# Patient Record
Sex: MALE | Race: WHITE | Employment: FULL TIME | URBAN - NONMETROPOLITAN AREA
[De-identification: names, ages, dates, MRNs, and addresses within clinical notes are randomized per-mention and may not be internally consistent; named-entity substitution may affect disease eponyms.]

---

## 2017-01-14 ENCOUNTER — OFFICE VISIT (OUTPATIENT)
Dept: FAMILY MEDICINE CLINIC | Age: 47
End: 2017-01-14

## 2017-01-14 VITALS
HEIGHT: 67 IN | WEIGHT: 200 LBS | BODY MASS INDEX: 31.39 KG/M2 | TEMPERATURE: 98.2 F | DIASTOLIC BLOOD PRESSURE: 72 MMHG | SYSTOLIC BLOOD PRESSURE: 121 MMHG | HEART RATE: 62 BPM | RESPIRATION RATE: 12 BRPM

## 2017-01-14 DIAGNOSIS — E78.00 HYPERCHOLESTEROLEMIA: ICD-10-CM

## 2017-01-14 DIAGNOSIS — R22.9 SKIN MASS: ICD-10-CM

## 2017-01-14 DIAGNOSIS — E10.69 TYPE 1 DIABETES MELLITUS WITH OTHER SPECIFIED COMPLICATION (HCC): Primary | ICD-10-CM

## 2017-01-14 DIAGNOSIS — N52.9 ERECTILE DYSFUNCTION, UNSPECIFIED ERECTILE DYSFUNCTION TYPE: ICD-10-CM

## 2017-01-14 LAB — HBA1C MFR BLD: 8.4 %

## 2017-01-14 PROCEDURE — G8427 DOCREV CUR MEDS BY ELIG CLIN: HCPCS | Performed by: FAMILY MEDICINE

## 2017-01-14 PROCEDURE — G8419 CALC BMI OUT NRM PARAM NOF/U: HCPCS | Performed by: FAMILY MEDICINE

## 2017-01-14 PROCEDURE — 3045F PR MOST RECENT HEMOGLOBIN A1C LEVEL 7.0-9.0%: CPT | Performed by: FAMILY MEDICINE

## 2017-01-14 PROCEDURE — 1036F TOBACCO NON-USER: CPT | Performed by: FAMILY MEDICINE

## 2017-01-14 PROCEDURE — 83036 HEMOGLOBIN GLYCOSYLATED A1C: CPT | Performed by: FAMILY MEDICINE

## 2017-01-14 PROCEDURE — 99214 OFFICE O/P EST MOD 30 MIN: CPT | Performed by: FAMILY MEDICINE

## 2017-01-14 PROCEDURE — G8484 FLU IMMUNIZE NO ADMIN: HCPCS | Performed by: FAMILY MEDICINE

## 2017-01-14 RX ORDER — INSULIN GLARGINE 100 [IU]/ML
60 INJECTION, SOLUTION SUBCUTANEOUS
Qty: 15 PEN | Refills: 5 | Status: SHIPPED | OUTPATIENT
Start: 2017-01-14 | End: 2017-09-28 | Stop reason: DRUGHIGH

## 2017-01-14 RX ORDER — SILDENAFIL 100 MG/1
100 TABLET, FILM COATED ORAL PRN
Qty: 45 TABLET | Refills: 3 | Status: SHIPPED | OUTPATIENT
Start: 2017-01-14 | End: 2018-02-28 | Stop reason: SDUPTHER

## 2017-01-14 ASSESSMENT — PATIENT HEALTH QUESTIONNAIRE - PHQ9
1. LITTLE INTEREST OR PLEASURE IN DOING THINGS: 0
SUM OF ALL RESPONSES TO PHQ QUESTIONS 1-9: 0
SUM OF ALL RESPONSES TO PHQ9 QUESTIONS 1 & 2: 0
2. FEELING DOWN, DEPRESSED OR HOPELESS: 0

## 2017-01-28 ENCOUNTER — OFFICE VISIT (OUTPATIENT)
Dept: FAMILY MEDICINE CLINIC | Age: 47
End: 2017-01-28

## 2017-01-28 VITALS
WEIGHT: 201 LBS | HEIGHT: 67 IN | HEART RATE: 91 BPM | BODY MASS INDEX: 31.55 KG/M2 | DIASTOLIC BLOOD PRESSURE: 64 MMHG | RESPIRATION RATE: 10 BRPM | TEMPERATURE: 98.6 F | SYSTOLIC BLOOD PRESSURE: 102 MMHG

## 2017-01-28 DIAGNOSIS — D22.9 ATYPICAL MOLE: Primary | ICD-10-CM

## 2017-01-28 DIAGNOSIS — E10.69 TYPE 1 DIABETES MELLITUS WITH OTHER SPECIFIED COMPLICATION (HCC): ICD-10-CM

## 2017-01-28 DIAGNOSIS — L91.8 SKIN TAG: ICD-10-CM

## 2017-01-28 DIAGNOSIS — D22.9 ATYPICAL MOLE: ICD-10-CM

## 2017-01-28 PROCEDURE — G8484 FLU IMMUNIZE NO ADMIN: HCPCS | Performed by: FAMILY MEDICINE

## 2017-01-28 PROCEDURE — 11200 RMVL SKIN TAGS UP TO&INC 15: CPT | Performed by: FAMILY MEDICINE

## 2017-01-28 PROCEDURE — 3045F PR MOST RECENT HEMOGLOBIN A1C LEVEL 7.0-9.0%: CPT | Performed by: FAMILY MEDICINE

## 2017-01-28 PROCEDURE — 99212 OFFICE O/P EST SF 10 MIN: CPT | Performed by: FAMILY MEDICINE

## 2017-01-28 PROCEDURE — G8427 DOCREV CUR MEDS BY ELIG CLIN: HCPCS | Performed by: FAMILY MEDICINE

## 2017-01-28 PROCEDURE — 11100 PR SHAV SKIN LES 6-10MM TRUNK,ARM,LEG: CPT | Performed by: FAMILY MEDICINE

## 2017-01-28 PROCEDURE — G8419 CALC BMI OUT NRM PARAM NOF/U: HCPCS | Performed by: FAMILY MEDICINE

## 2017-01-28 PROCEDURE — 4004F PT TOBACCO SCREEN RCVD TLK: CPT | Performed by: FAMILY MEDICINE

## 2017-01-31 ENCOUNTER — TELEPHONE (OUTPATIENT)
Dept: FAMILY MEDICINE CLINIC | Age: 47
End: 2017-01-31

## 2017-03-14 ENCOUNTER — TELEPHONE (OUTPATIENT)
Dept: PULMONOLOGY | Age: 47
End: 2017-03-14

## 2017-03-27 ENCOUNTER — TELEPHONE (OUTPATIENT)
Dept: FAMILY MEDICINE CLINIC | Age: 47
End: 2017-03-27

## 2017-05-01 ENCOUNTER — INITIAL CONSULT (OUTPATIENT)
Dept: PULMONOLOGY | Age: 47
End: 2017-05-01

## 2017-05-01 VITALS
SYSTOLIC BLOOD PRESSURE: 130 MMHG | HEART RATE: 87 BPM | BODY MASS INDEX: 31.23 KG/M2 | WEIGHT: 199 LBS | HEIGHT: 67 IN | RESPIRATION RATE: 16 BRPM | DIASTOLIC BLOOD PRESSURE: 82 MMHG | OXYGEN SATURATION: 96 %

## 2017-05-01 DIAGNOSIS — G47.33 OSA (OBSTRUCTIVE SLEEP APNEA): ICD-10-CM

## 2017-05-01 DIAGNOSIS — R40.0 DAYTIME SLEEPINESS: ICD-10-CM

## 2017-05-01 DIAGNOSIS — R06.83 SNORING: ICD-10-CM

## 2017-05-01 PROCEDURE — 99203 OFFICE O/P NEW LOW 30 MIN: CPT | Performed by: INTERNAL MEDICINE

## 2017-05-01 PROCEDURE — G8419 CALC BMI OUT NRM PARAM NOF/U: HCPCS | Performed by: INTERNAL MEDICINE

## 2017-05-01 PROCEDURE — G8427 DOCREV CUR MEDS BY ELIG CLIN: HCPCS | Performed by: INTERNAL MEDICINE

## 2017-05-01 PROCEDURE — 4004F PT TOBACCO SCREEN RCVD TLK: CPT | Performed by: INTERNAL MEDICINE

## 2017-07-24 RX ORDER — PEN NEEDLE, DIABETIC 30 GX5/16"
NEEDLE, DISPOSABLE MISCELLANEOUS
Qty: 450 EACH | Refills: 5 | Status: SHIPPED | OUTPATIENT
Start: 2017-07-24 | End: 2019-09-27 | Stop reason: SDUPTHER

## 2017-08-15 ENCOUNTER — TELEPHONE (OUTPATIENT)
Dept: FAMILY MEDICINE CLINIC | Age: 47
End: 2017-08-15

## 2017-09-21 ENCOUNTER — TELEPHONE (OUTPATIENT)
Dept: FAMILY MEDICINE CLINIC | Age: 47
End: 2017-09-21

## 2017-09-28 ENCOUNTER — OFFICE VISIT (OUTPATIENT)
Dept: FAMILY MEDICINE CLINIC | Age: 47
End: 2017-09-28
Payer: COMMERCIAL

## 2017-09-28 VITALS
BODY MASS INDEX: 31.55 KG/M2 | HEIGHT: 67 IN | DIASTOLIC BLOOD PRESSURE: 67 MMHG | HEART RATE: 60 BPM | TEMPERATURE: 98.7 F | WEIGHT: 201 LBS | RESPIRATION RATE: 12 BRPM | SYSTOLIC BLOOD PRESSURE: 124 MMHG

## 2017-09-28 DIAGNOSIS — E10.69 TYPE 1 DIABETES MELLITUS WITH OTHER SPECIFIED COMPLICATION (HCC): Primary | ICD-10-CM

## 2017-09-28 DIAGNOSIS — G89.29 CHRONIC RIGHT SHOULDER PAIN: ICD-10-CM

## 2017-09-28 DIAGNOSIS — M25.511 CHRONIC RIGHT SHOULDER PAIN: ICD-10-CM

## 2017-09-28 LAB
CREATININE URINE POCT: NORMAL
HBA1C MFR BLD: 9 %
MICROALBUMIN/CREAT 24H UR: NORMAL MG/G{CREAT}
MICROALBUMIN/CREAT UR-RTO: NORMAL

## 2017-09-28 PROCEDURE — 83036 HEMOGLOBIN GLYCOSYLATED A1C: CPT | Performed by: FAMILY MEDICINE

## 2017-09-28 PROCEDURE — 4004F PT TOBACCO SCREEN RCVD TLK: CPT | Performed by: FAMILY MEDICINE

## 2017-09-28 PROCEDURE — G8427 DOCREV CUR MEDS BY ELIG CLIN: HCPCS | Performed by: FAMILY MEDICINE

## 2017-09-28 PROCEDURE — 99213 OFFICE O/P EST LOW 20 MIN: CPT | Performed by: FAMILY MEDICINE

## 2017-09-28 PROCEDURE — 3046F HEMOGLOBIN A1C LEVEL >9.0%: CPT | Performed by: FAMILY MEDICINE

## 2017-09-28 PROCEDURE — G8417 CALC BMI ABV UP PARAM F/U: HCPCS | Performed by: FAMILY MEDICINE

## 2017-09-28 PROCEDURE — 82044 UR ALBUMIN SEMIQUANTITATIVE: CPT | Performed by: FAMILY MEDICINE

## 2017-09-28 RX ORDER — ETODOLAC 400 MG/1
400 TABLET, FILM COATED ORAL 2 TIMES DAILY PRN
Qty: 60 TABLET | Refills: 3 | Status: SHIPPED | OUTPATIENT
Start: 2017-09-28 | End: 2018-02-28 | Stop reason: SDUPTHER

## 2017-09-28 RX ORDER — INSULIN GLARGINE 100 [IU]/ML
65 INJECTION, SOLUTION SUBCUTANEOUS
Qty: 15 PEN | Refills: 5 | Status: SHIPPED | OUTPATIENT
Start: 2017-09-28 | End: 2018-02-28 | Stop reason: SDUPTHER

## 2018-02-28 ENCOUNTER — HOSPITAL ENCOUNTER (OUTPATIENT)
Age: 48
Discharge: HOME | End: 2018-02-28
Payer: COMMERCIAL

## 2018-02-28 ENCOUNTER — TELEPHONE (OUTPATIENT)
Dept: FAMILY MEDICINE CLINIC | Age: 48
End: 2018-02-28

## 2018-02-28 ENCOUNTER — OFFICE VISIT (OUTPATIENT)
Dept: FAMILY MEDICINE CLINIC | Age: 48
End: 2018-02-28
Payer: COMMERCIAL

## 2018-02-28 VITALS
HEART RATE: 94 BPM | BODY MASS INDEX: 32.49 KG/M2 | DIASTOLIC BLOOD PRESSURE: 87 MMHG | SYSTOLIC BLOOD PRESSURE: 138 MMHG | TEMPERATURE: 98.1 F | RESPIRATION RATE: 10 BRPM | WEIGHT: 207 LBS | HEIGHT: 67 IN

## 2018-02-28 DIAGNOSIS — R53.83 OTHER FATIGUE: ICD-10-CM

## 2018-02-28 DIAGNOSIS — M25.511 CHRONIC RIGHT SHOULDER PAIN: ICD-10-CM

## 2018-02-28 DIAGNOSIS — G89.29 CHRONIC RIGHT SHOULDER PAIN: ICD-10-CM

## 2018-02-28 DIAGNOSIS — R40.0 DAYTIME SLEEPINESS: ICD-10-CM

## 2018-02-28 DIAGNOSIS — E10.8 TYPE 1 DIABETES MELLITUS WITH COMPLICATION (HCC): ICD-10-CM

## 2018-02-28 DIAGNOSIS — N50.89 MASS OF LEFT TESTICLE: ICD-10-CM

## 2018-02-28 DIAGNOSIS — E10.69 TYPE 1 DIABETES MELLITUS WITH OTHER SPECIFIED COMPLICATION (HCC): ICD-10-CM

## 2018-02-28 DIAGNOSIS — N52.9 ERECTILE DYSFUNCTION, UNSPECIFIED ERECTILE DYSFUNCTION TYPE: ICD-10-CM

## 2018-02-28 DIAGNOSIS — E10.69 TYPE 1 DIABETES MELLITUS WITH OTHER SPECIFIED COMPLICATION (HCC): Primary | ICD-10-CM

## 2018-02-28 LAB
ALBUMIN SERPL-MCNC: 4.1 G/DL (ref 3.5–5.1)
ALP BLD-CCNC: 107 U/L (ref 38–126)
ALT SERPL-CCNC: 27 U/L (ref 11–66)
ANION GAP SERPL CALCULATED.3IONS-SCNC: 11 MEQ/L (ref 8–16)
AST SERPL-CCNC: 18 U/L (ref 5–40)
BASOPHILS # BLD: 0.4 %
BASOPHILS ABSOLUTE: 0 THOU/MM3 (ref 0–0.1)
BILIRUB SERPL-MCNC: 0.3 MG/DL (ref 0.3–1.2)
BUN BLDV-MCNC: 12 MG/DL (ref 7–22)
CALCIUM SERPL-MCNC: 9.2 MG/DL (ref 8.5–10.5)
CHLORIDE BLD-SCNC: 102 MEQ/L (ref 98–111)
CO2: 27 MEQ/L (ref 23–33)
CREAT SERPL-MCNC: 0.7 MG/DL (ref 0.4–1.2)
EOSINOPHIL # BLD: 1.7 %
EOSINOPHILS ABSOLUTE: 0.2 THOU/MM3 (ref 0–0.4)
GFR SERPL CREATININE-BSD FRML MDRD: > 90 ML/MIN/1.73M2
GLUCOSE BLD-MCNC: 169 MG/DL (ref 70–108)
HBA1C MFR BLD: 8.2 %
HCT VFR BLD CALC: 47.2 % (ref 42–52)
HEMOGLOBIN: 15.8 GM/DL (ref 14–18)
LYMPHOCYTES # BLD: 17.6 %
LYMPHOCYTES ABSOLUTE: 1.9 THOU/MM3 (ref 1–4.8)
MCH RBC QN AUTO: 31.8 PG (ref 27–31)
MCHC RBC AUTO-ENTMCNC: 33.4 GM/DL (ref 33–37)
MCV RBC AUTO: 95.2 FL (ref 80–94)
MONOCYTES # BLD: 7.4 %
MONOCYTES ABSOLUTE: 0.8 THOU/MM3 (ref 0.4–1.3)
NUCLEATED RED BLOOD CELLS: 0 /100 WBC
PDW BLD-RTO: 14.4 % (ref 11.5–14.5)
PLATELET # BLD: 317 THOU/MM3 (ref 130–400)
PMV BLD AUTO: 10.2 FL (ref 7.4–10.4)
POTASSIUM SERPL-SCNC: 5.1 MEQ/L (ref 3.5–5.2)
RBC # BLD: 4.95 MILL/MM3 (ref 4.7–6.1)
SEG NEUTROPHILS: 72.9 %
SEGMENTED NEUTROPHILS ABSOLUTE COUNT: 7.9 THOU/MM3 (ref 1.8–7.7)
SODIUM BLD-SCNC: 140 MEQ/L (ref 135–145)
TOTAL PROTEIN: 6.9 G/DL (ref 6.1–8)
TSH SERPL DL<=0.05 MIU/L-ACNC: 1.27 UIU/ML (ref 0.4–4.2)
WBC # BLD: 10.9 THOU/MM3 (ref 4.8–10.8)

## 2018-02-28 PROCEDURE — 83036 HEMOGLOBIN GLYCOSYLATED A1C: CPT | Performed by: FAMILY MEDICINE

## 2018-02-28 PROCEDURE — 85025 COMPLETE CBC W/AUTO DIFF WBC: CPT

## 2018-02-28 PROCEDURE — 3045F PR MOST RECENT HEMOGLOBIN A1C LEVEL 7.0-9.0%: CPT | Performed by: FAMILY MEDICINE

## 2018-02-28 PROCEDURE — G8484 FLU IMMUNIZE NO ADMIN: HCPCS | Performed by: FAMILY MEDICINE

## 2018-02-28 PROCEDURE — G8417 CALC BMI ABV UP PARAM F/U: HCPCS | Performed by: FAMILY MEDICINE

## 2018-02-28 PROCEDURE — 36415 COLL VENOUS BLD VENIPUNCTURE: CPT

## 2018-02-28 PROCEDURE — 99214 OFFICE O/P EST MOD 30 MIN: CPT | Performed by: FAMILY MEDICINE

## 2018-02-28 PROCEDURE — 84443 ASSAY THYROID STIM HORMONE: CPT

## 2018-02-28 PROCEDURE — 4004F PT TOBACCO SCREEN RCVD TLK: CPT | Performed by: FAMILY MEDICINE

## 2018-02-28 PROCEDURE — 80053 COMPREHEN METABOLIC PANEL: CPT

## 2018-02-28 PROCEDURE — G8427 DOCREV CUR MEDS BY ELIG CLIN: HCPCS | Performed by: FAMILY MEDICINE

## 2018-02-28 RX ORDER — SILDENAFIL 100 MG/1
100 TABLET, FILM COATED ORAL PRN
Qty: 45 TABLET | Refills: 3 | Status: SHIPPED | OUTPATIENT
Start: 2018-02-28 | End: 2019-01-14 | Stop reason: SDUPTHER

## 2018-02-28 RX ORDER — ETODOLAC 400 MG/1
400 TABLET, FILM COATED ORAL 2 TIMES DAILY PRN
Qty: 180 TABLET | Refills: 3 | Status: SHIPPED | OUTPATIENT
Start: 2018-02-28 | End: 2018-05-07

## 2018-02-28 RX ORDER — INSULIN GLARGINE 100 [IU]/ML
65 INJECTION, SOLUTION SUBCUTANEOUS
Qty: 15 PEN | Refills: 5 | Status: SHIPPED | OUTPATIENT
Start: 2018-02-28 | End: 2018-03-07 | Stop reason: SDUPTHER

## 2018-02-28 ASSESSMENT — PATIENT HEALTH QUESTIONNAIRE - PHQ9
SUM OF ALL RESPONSES TO PHQ QUESTIONS 1-9: 0
2. FEELING DOWN, DEPRESSED OR HOPELESS: 0
SUM OF ALL RESPONSES TO PHQ9 QUESTIONS 1 & 2: 0
1. LITTLE INTEREST OR PLEASURE IN DOING THINGS: 0

## 2018-02-28 NOTE — PROGRESS NOTES
Washington Peres is a 41 y.o. male that presents for Follow-up and Other (due for DM eye and foot exam )        HPI:    Patient notes that he is having more fatigue recently, states that it has been present for several months though. States that he comes home from work and just wants to sleep. Feels very tired when waking up. Does have a history of RANDEE, but does not use CPAP. Mood has been 'fine'. Found a mass on his left testicle 3 days ago. Notes that he is having some soreness with this as well. No tenderness or discoloration. No change in size since first noticing. Diabetes Type 1    Glucose control:   Does patient check blood glucoses at home? Yes - states that BGs are labile per patient  Report of hypoglycemia: no  Lab Results   Component Value Date    LABA1C 8.2 02/28/2018     No results found for: EAG    Symptoms  Polyuria, Polydipsia or Polyphagia? No  Chest Pain, SOB, or Palpitations? -  No  New Vision complaints? No  Paresthesias of the extremities? No    Medications  Current medication were reviewed. Compliant with medications? yes  Medication side effects? No  On ACE-I or ARB? No  On antiplatelet therapy? Yes  On Statin? No    Exercise  Exercise? No  Wt Readings from Last 3 Encounters:   02/28/18 207 lb (93.9 kg)   09/28/17 201 lb (91.2 kg)   05/01/17 199 lb (90.3 kg)       Diet discipline?:  Low salt, fat, sugar diet?   No    Blood pressure control:  BP Readings from Last 3 Encounters:   02/28/18 138/87   09/28/17 124/67   05/01/17 130/82       Lab Results   Component Value Date    LABMICR 0.70 07/29/2013       Lab Results   Component Value Date    LDLCALC 156 03/22/2016           Health Maintenance   Topic Date Due    HIV screen  03/31/1987    DTaP/Tdap/Td vaccine (1 - Tdap) 03/31/1991    Diabetic retinal exam  06/27/2015    Lipid screen  03/22/2017    Diabetic foot exam  04/01/2017    Flu vaccine (1) 09/01/2017    A1C test (Diabetic or Prediabetic)  09/28/2018    Diabetic microalbuminuria test  09/28/2018    Pneumococcal med risk  Completed       Past Medical History:   Diagnosis Date    Hyperlipidemia 2012    Type 1 diabetes mellitus ([facility] [address] 1995    at age 27       History reviewed. No pertinent surgical history. Social History   Substance Use Topics    Smoking status: Current Every Day Smoker     Packs/day: 0.50     Years: 15.00    Smokeless tobacco: Never Used    Alcohol use Yes      Comment: social       Family History   Problem Relation Age of Onset    Asthma Mother     High Blood Pressure Father 48    Asthma Sister          I have reviewed the patient's past medical history, past surgical history, allergies, medications, social and family history and I have made updates where appropriate.     ROS        PHYSICAL EXAM:  /87   Pulse 94   Temp 98.1 °F (36.7 °C) (Oral)   Resp 10   Ht 5' 7\" (1.702 m)   Wt 207 lb (93.9 kg)   BMI 32.42 kg/m²     General Appearance: well developed and well- nourished, in no acute distress  Head: normocephalic and atraumatic  ENT: external ear and ear canal normal bilaterally, nose without deformity, nasal mucosa and turbinates normal without polyps, oropharynx normal, dentition is normal for age, no lip or gum lesions noted  Neck: supple and non-tender without mass, no thyromegaly or thyroid nodules, no cervical lymphadenopathy  Pulmonary/Chest: clear to auscultation bilaterally- no wheezes, rales or rhonchi, normal air movement, no respiratory distress or retractions  Cardiovascular: normal rate, regular rhythm, normal S1 and S2, no murmurs, rubs, clicks, or gallops, distal pulses intact  Extremities: no cyanosis, clubbing or edema of the lower extremities  Psych:  Normal affect without evidence of depression or anxiety, insight and judgement are appropriate, memory appears intact  Skin: warm and dry, no rash or erythema   Exam:  normal genitalia, normal testes and scrotum, no hernias present, no urethral discharge

## 2018-02-28 NOTE — PATIENT INSTRUCTIONS
You may receive a survey about your visit with us today. The feedback from our patients helps us identify what is working well and where the service to all patients can be enhanced. Thank you! Tobacco Cessation Programs     Telephonic behavior modification  Krzysztof Casper ([phone])   Counseling service for those who are ready to quit using tobacco.     Available for uninsured [place] residents, [facility] recipients, pregnant women, or patients whose health plans or employers are members of the [address] [place] [address] behavior modification   http://[place]. Quitlogix. org   Online support program to help patients through each step of the quitting process. Available 24 hours a day 7 days a week. Provides up to date researched based tool, step-by-step guides, and motivational messages. Online behavior modification   www.lungusa.org/stop-smoking/how-to-quit   HelpLine: [facility] (872-3106)   Email questions to:  Yonas@[employer]. [employer]    Website offers resources to help tobacco users figure out their reasons for quitting and then take the big step of quitting for good. Hypnosis   Location: [facility], [place], [place]   Contact: Donn Perera, PhD at [phone]   Hypnosis for tobacco cessation   Cost $225 for the initial session and $175 for each session afterwards. Most patients require 6-8 sessions. There is the option to submit through the patients insurance. Hypnosis and behavior modification   Location: [facility],  [address] [place]   Contact: Chilo Carney, PhD at [phone]  Nabila Flannery Counseling and hypnosis for nicotine addition   Cost: For uninsured patients:  Please call above phone number  Cost for insured patients depends on patients insurance plan.     Behavior modification   Location: [facility], [address] [place], [address]: [place] [address] include four one-on-one appointments between the patient and a

## 2018-03-01 ENCOUNTER — TELEPHONE (OUTPATIENT)
Dept: FAMILY MEDICINE CLINIC | Age: 48
End: 2018-03-01

## 2018-03-01 DIAGNOSIS — G47.33 OSA (OBSTRUCTIVE SLEEP APNEA): Primary | ICD-10-CM

## 2018-03-01 NOTE — TELEPHONE ENCOUNTER
Pt informed and verbalized understanding. He is interested in seeing a pulm/sleep center for his fatigue.

## 2018-03-07 ENCOUNTER — TELEPHONE (OUTPATIENT)
Dept: FAMILY MEDICINE CLINIC | Age: 48
End: 2018-03-07

## 2018-03-07 ENCOUNTER — HOSPITAL ENCOUNTER (OUTPATIENT)
Dept: ULTRASOUND IMAGING | Age: 48
Discharge: HOME | End: 2018-03-07
Payer: COMMERCIAL

## 2018-03-07 DIAGNOSIS — N50.89 MASS OF LEFT TESTICLE: ICD-10-CM

## 2018-03-07 DIAGNOSIS — E10.69 TYPE 1 DIABETES MELLITUS WITH OTHER SPECIFIED COMPLICATION (HCC): ICD-10-CM

## 2018-03-07 PROCEDURE — 76870 US EXAM SCROTUM: CPT

## 2018-03-07 RX ORDER — INSULIN GLARGINE 100 [IU]/ML
65 INJECTION, SOLUTION SUBCUTANEOUS
Qty: 15 PEN | Refills: 5 | Status: SHIPPED | OUTPATIENT
Start: 2018-03-07 | End: 2019-01-14 | Stop reason: DRUGHIGH

## 2018-03-08 ENCOUNTER — TELEPHONE (OUTPATIENT)
Dept: FAMILY MEDICINE CLINIC | Age: 48
End: 2018-03-08

## 2018-03-08 NOTE — TELEPHONE ENCOUNTER
----- Message from Kyle Mcknight DO sent at 3/8/2018 10:53 AM EST -----  US displayed a small fluid collection bilaterally. This is a benign finding. If he would like to pursue further treatment for this, I can refer him to urology.

## 2018-05-07 ENCOUNTER — OFFICE VISIT (OUTPATIENT)
Dept: PULMONOLOGY | Age: 48
End: 2018-05-07
Payer: COMMERCIAL

## 2018-05-07 VITALS
OXYGEN SATURATION: 96 % | SYSTOLIC BLOOD PRESSURE: 122 MMHG | WEIGHT: 205.6 LBS | BODY MASS INDEX: 32.27 KG/M2 | DIASTOLIC BLOOD PRESSURE: 84 MMHG | RESPIRATION RATE: 16 BRPM | HEART RATE: 88 BPM | HEIGHT: 67 IN

## 2018-05-07 DIAGNOSIS — G47.33 OSA (OBSTRUCTIVE SLEEP APNEA): Primary | ICD-10-CM

## 2018-05-07 PROCEDURE — 4004F PT TOBACCO SCREEN RCVD TLK: CPT | Performed by: INTERNAL MEDICINE

## 2018-05-07 PROCEDURE — G8417 CALC BMI ABV UP PARAM F/U: HCPCS | Performed by: INTERNAL MEDICINE

## 2018-05-07 PROCEDURE — G8427 DOCREV CUR MEDS BY ELIG CLIN: HCPCS | Performed by: INTERNAL MEDICINE

## 2018-05-07 PROCEDURE — 99213 OFFICE O/P EST LOW 20 MIN: CPT | Performed by: INTERNAL MEDICINE

## 2018-05-08 ENCOUNTER — TELEPHONE (OUTPATIENT)
Dept: SLEEP CENTER | Age: 48
End: 2018-05-08

## 2018-05-09 ENCOUNTER — TELEPHONE (OUTPATIENT)
Dept: SLEEP CENTER | Age: 48
End: 2018-05-09

## 2018-06-08 DIAGNOSIS — G47.33 OSA (OBSTRUCTIVE SLEEP APNEA): Primary | ICD-10-CM

## 2018-06-09 ENCOUNTER — HOSPITAL ENCOUNTER (OUTPATIENT)
Age: 48
Discharge: HOME | End: 2018-06-09
Payer: COMMERCIAL

## 2018-06-09 ENCOUNTER — OFFICE VISIT (OUTPATIENT)
Dept: FAMILY MEDICINE CLINIC | Age: 48
End: 2018-06-09
Payer: COMMERCIAL

## 2018-06-09 VITALS
TEMPERATURE: 98.3 F | SYSTOLIC BLOOD PRESSURE: 118 MMHG | RESPIRATION RATE: 10 BRPM | BODY MASS INDEX: 30.92 KG/M2 | HEIGHT: 68 IN | HEART RATE: 91 BPM | WEIGHT: 204 LBS | DIASTOLIC BLOOD PRESSURE: 62 MMHG

## 2018-06-09 DIAGNOSIS — Z72.51 HIGH RISK SEXUAL BEHAVIOR: ICD-10-CM

## 2018-06-09 DIAGNOSIS — E10.69 TYPE 1 DIABETES MELLITUS WITH OTHER SPECIFIED COMPLICATION (HCC): Primary | ICD-10-CM

## 2018-06-09 DIAGNOSIS — E78.00 HYPERCHOLESTEROLEMIA: ICD-10-CM

## 2018-06-09 DIAGNOSIS — Z11.4 SCREENING FOR HIV (HUMAN IMMUNODEFICIENCY VIRUS): ICD-10-CM

## 2018-06-09 DIAGNOSIS — E10.69 TYPE 1 DIABETES MELLITUS WITH OTHER SPECIFIED COMPLICATION (HCC): ICD-10-CM

## 2018-06-09 LAB
ALBUMIN SERPL-MCNC: 4.4 G/DL (ref 3.5–5.1)
ALP BLD-CCNC: 115 U/L (ref 38–126)
ALT SERPL-CCNC: 35 U/L (ref 11–66)
ANION GAP SERPL CALCULATED.3IONS-SCNC: 12 MEQ/L (ref 8–16)
AST SERPL-CCNC: 25 U/L (ref 5–40)
BILIRUB SERPL-MCNC: 0.4 MG/DL (ref 0.3–1.2)
BUN BLDV-MCNC: 11 MG/DL (ref 7–22)
CALCIUM SERPL-MCNC: 9.3 MG/DL (ref 8.5–10.5)
CHLAMYDIA TRACHOMATIS BY RT-PCR: NOT DETECTED
CHLORIDE BLD-SCNC: 104 MEQ/L (ref 98–111)
CHOLESTEROL, TOTAL: 237 MG/DL (ref 100–199)
CO2: 27 MEQ/L (ref 23–33)
CREAT SERPL-MCNC: 0.7 MG/DL (ref 0.4–1.2)
CT/NG SOURCE: NORMAL
GFR SERPL CREATININE-BSD FRML MDRD: > 90 ML/MIN/1.73M2
GLUCOSE BLD-MCNC: 221 MG/DL (ref 70–108)
HBA1C MFR BLD: 8.9 %
HDLC SERPL-MCNC: 50 MG/DL
LDL CHOLESTEROL CALCULATED: 165 MG/DL
NEISSERIA GONORRHOEAE BY RT-PCR: NOT DETECTED
POTASSIUM SERPL-SCNC: 4.6 MEQ/L (ref 3.5–5.2)
RPR: NONREACTIVE
SODIUM BLD-SCNC: 143 MEQ/L (ref 135–145)
TOTAL PROTEIN: 7.6 G/DL (ref 6.1–8)
TRIGL SERPL-MCNC: 109 MG/DL (ref 0–199)

## 2018-06-09 PROCEDURE — 36415 COLL VENOUS BLD VENIPUNCTURE: CPT

## 2018-06-09 PROCEDURE — 80061 LIPID PANEL: CPT

## 2018-06-09 PROCEDURE — 4004F PT TOBACCO SCREEN RCVD TLK: CPT | Performed by: FAMILY MEDICINE

## 2018-06-09 PROCEDURE — G8427 DOCREV CUR MEDS BY ELIG CLIN: HCPCS | Performed by: FAMILY MEDICINE

## 2018-06-09 PROCEDURE — G8417 CALC BMI ABV UP PARAM F/U: HCPCS | Performed by: FAMILY MEDICINE

## 2018-06-09 PROCEDURE — 2022F DILAT RTA XM EVC RTNOPTHY: CPT | Performed by: FAMILY MEDICINE

## 2018-06-09 PROCEDURE — 87491 CHLMYD TRACH DNA AMP PROBE: CPT

## 2018-06-09 PROCEDURE — 87389 HIV-1 AG W/HIV-1&-2 AB AG IA: CPT

## 2018-06-09 PROCEDURE — 87591 N.GONORRHOEAE DNA AMP PROB: CPT

## 2018-06-09 PROCEDURE — 80053 COMPREHEN METABOLIC PANEL: CPT

## 2018-06-09 PROCEDURE — 86592 SYPHILIS TEST NON-TREP QUAL: CPT

## 2018-06-09 PROCEDURE — 3045F PR MOST RECENT HEMOGLOBIN A1C LEVEL 7.0-9.0%: CPT | Performed by: FAMILY MEDICINE

## 2018-06-09 PROCEDURE — 99213 OFFICE O/P EST LOW 20 MIN: CPT | Performed by: FAMILY MEDICINE

## 2018-06-09 PROCEDURE — 83036 HEMOGLOBIN GLYCOSYLATED A1C: CPT | Performed by: FAMILY MEDICINE

## 2018-06-11 ENCOUNTER — TELEPHONE (OUTPATIENT)
Dept: PULMONOLOGY | Age: 48
End: 2018-06-11

## 2018-06-11 ENCOUNTER — TELEPHONE (OUTPATIENT)
Dept: SLEEP CENTER | Age: 48
End: 2018-06-11

## 2018-06-14 ENCOUNTER — TELEPHONE (OUTPATIENT)
Dept: FAMILY MEDICINE CLINIC | Age: 48
End: 2018-06-14

## 2018-06-14 LAB — HIV-2 AB: NEGATIVE

## 2018-07-17 ENCOUNTER — TELEPHONE (OUTPATIENT)
Dept: PULMONOLOGY | Age: 48
End: 2018-07-17

## 2018-07-17 NOTE — TELEPHONE ENCOUNTER
Patient called and wondered if we had ordered him a CPAP machine. I told him that he requested [address], when I spoke with [facility] supplies they do not supply CPAP machines. I called [facility] and gave them the information to get this order processed for the patient.

## 2018-07-30 ENCOUNTER — HOSPITAL ENCOUNTER (OUTPATIENT)
Dept: MRI IMAGING | Age: 48
Discharge: HOME | End: 2018-07-30
Payer: COMMERCIAL

## 2018-07-30 DIAGNOSIS — M54.12 RADICULOPATHY, CERVICAL: ICD-10-CM

## 2018-07-30 PROCEDURE — 72141 MRI NECK SPINE W/O DYE: CPT

## 2019-01-14 ENCOUNTER — OFFICE VISIT (OUTPATIENT)
Dept: FAMILY MEDICINE CLINIC | Age: 49
End: 2019-01-14
Payer: COMMERCIAL

## 2019-01-14 VITALS
DIASTOLIC BLOOD PRESSURE: 80 MMHG | HEART RATE: 96 BPM | WEIGHT: 208 LBS | HEIGHT: 70 IN | RESPIRATION RATE: 16 BRPM | SYSTOLIC BLOOD PRESSURE: 132 MMHG | BODY MASS INDEX: 29.78 KG/M2 | TEMPERATURE: 98.6 F

## 2019-01-14 DIAGNOSIS — R53.83 OTHER FATIGUE: ICD-10-CM

## 2019-01-14 DIAGNOSIS — E10.69 TYPE 1 DIABETES MELLITUS WITH OTHER SPECIFIED COMPLICATION (HCC): Primary | ICD-10-CM

## 2019-01-14 DIAGNOSIS — N52.9 ERECTILE DYSFUNCTION, UNSPECIFIED ERECTILE DYSFUNCTION TYPE: ICD-10-CM

## 2019-01-14 DIAGNOSIS — F17.210 CIGARETTE NICOTINE DEPENDENCE WITHOUT COMPLICATION: ICD-10-CM

## 2019-01-14 LAB
CREATININE URINE POCT: NORMAL
HBA1C MFR BLD: 8.5 %
MICROALBUMIN/CREAT 24H UR: NORMAL MG/G{CREAT}
MICROALBUMIN/CREAT UR-RTO: NORMAL

## 2019-01-14 PROCEDURE — 3046F HEMOGLOBIN A1C LEVEL >9.0%: CPT | Performed by: FAMILY MEDICINE

## 2019-01-14 PROCEDURE — G8427 DOCREV CUR MEDS BY ELIG CLIN: HCPCS | Performed by: FAMILY MEDICINE

## 2019-01-14 PROCEDURE — 2022F DILAT RTA XM EVC RTNOPTHY: CPT | Performed by: FAMILY MEDICINE

## 2019-01-14 PROCEDURE — 82044 UR ALBUMIN SEMIQUANTITATIVE: CPT | Performed by: FAMILY MEDICINE

## 2019-01-14 PROCEDURE — 99214 OFFICE O/P EST MOD 30 MIN: CPT | Performed by: FAMILY MEDICINE

## 2019-01-14 PROCEDURE — 83036 HEMOGLOBIN GLYCOSYLATED A1C: CPT | Performed by: FAMILY MEDICINE

## 2019-01-14 PROCEDURE — G8417 CALC BMI ABV UP PARAM F/U: HCPCS | Performed by: FAMILY MEDICINE

## 2019-01-14 PROCEDURE — 4004F PT TOBACCO SCREEN RCVD TLK: CPT | Performed by: FAMILY MEDICINE

## 2019-01-14 PROCEDURE — G8484 FLU IMMUNIZE NO ADMIN: HCPCS | Performed by: FAMILY MEDICINE

## 2019-01-14 RX ORDER — VARENICLINE TARTRATE 1 MG/1
1 TABLET, FILM COATED ORAL 2 TIMES DAILY
Qty: 60 TABLET | Refills: 2 | Status: SHIPPED | OUTPATIENT
Start: 2019-01-14 | End: 2019-06-03

## 2019-01-14 RX ORDER — SILDENAFIL 100 MG/1
100 TABLET, FILM COATED ORAL PRN
Qty: 45 TABLET | Refills: 3 | Status: SHIPPED | OUTPATIENT
Start: 2019-01-14 | End: 2019-01-14

## 2019-01-14 RX ORDER — INSULIN GLARGINE 100 [IU]/ML
70 INJECTION, SOLUTION SUBCUTANEOUS
Qty: 15 PEN | Refills: 5 | Status: SHIPPED | OUTPATIENT
Start: 2019-01-14 | End: 2019-09-27 | Stop reason: SDUPTHER

## 2019-01-14 RX ORDER — VARENICLINE TARTRATE 25 MG
KIT ORAL
Qty: 42 TABLET | Refills: 0 | Status: SHIPPED | OUTPATIENT
Start: 2019-01-14 | End: 2021-02-21

## 2019-01-14 RX ORDER — TADALAFIL 20 MG/1
20 TABLET ORAL DAILY PRN
Qty: 30 TABLET | Refills: 2 | Status: SHIPPED | OUTPATIENT
Start: 2019-01-14 | End: 2019-09-27 | Stop reason: SDUPTHER

## 2019-02-02 ENCOUNTER — HOSPITAL ENCOUNTER (OUTPATIENT)
Age: 49
Discharge: HOME | End: 2019-02-02
Payer: COMMERCIAL

## 2019-02-02 DIAGNOSIS — R53.83 OTHER FATIGUE: ICD-10-CM

## 2019-02-02 PROCEDURE — 36415 COLL VENOUS BLD VENIPUNCTURE: CPT

## 2019-02-02 PROCEDURE — 84403 ASSAY OF TOTAL TESTOSTERONE: CPT

## 2019-02-06 LAB — TESTOSTERONE TOTAL: 762 NG/DL (ref 300–890)

## 2019-05-24 ENCOUNTER — HOSPITAL ENCOUNTER (OUTPATIENT)
Dept: GENERAL RADIOLOGY | Age: 49
Discharge: HOME | End: 2019-05-24
Payer: COMMERCIAL

## 2019-05-24 ENCOUNTER — HOSPITAL ENCOUNTER (OUTPATIENT)
Age: 49
Discharge: HOME | End: 2019-05-24
Payer: COMMERCIAL

## 2019-05-24 DIAGNOSIS — Z01.811 PRE-OP CHEST EXAM: ICD-10-CM

## 2019-05-24 LAB
ANION GAP SERPL CALCULATED.3IONS-SCNC: 13 MEQ/L (ref 8–16)
AVERAGE GLUCOSE: 204 MG/DL (ref 70–126)
BUN BLDV-MCNC: 16 MG/DL (ref 7–22)
CALCIUM SERPL-MCNC: 9.6 MG/DL (ref 8.5–10.5)
CHLORIDE BLD-SCNC: 103 MEQ/L (ref 98–111)
CO2: 27 MEQ/L (ref 23–33)
CREAT SERPL-MCNC: 0.7 MG/DL (ref 0.4–1.2)
EKG ATRIAL RATE: 97 BPM
EKG P AXIS: 49 DEGREES
EKG P-R INTERVAL: 140 MS
EKG Q-T INTERVAL: 334 MS
EKG QRS DURATION: 86 MS
EKG QTC CALCULATION (BAZETT): 424 MS
EKG R AXIS: 90 DEGREES
EKG T AXIS: 58 DEGREES
EKG VENTRICULAR RATE: 97 BPM
ERYTHROCYTE [DISTWIDTH] IN BLOOD BY AUTOMATED COUNT: 13.1 % (ref 11.5–14.5)
ERYTHROCYTE [DISTWIDTH] IN BLOOD BY AUTOMATED COUNT: 47.2 FL (ref 35–45)
GFR SERPL CREATININE-BSD FRML MDRD: > 90 ML/MIN/1.73M2
GLUCOSE BLD-MCNC: 50 MG/DL (ref 70–108)
HBA1C MFR BLD: 8.8 % (ref 4.4–6.4)
HCT VFR BLD CALC: 50.7 % (ref 42–52)
HEMOGLOBIN: 16.9 GM/DL (ref 14–18)
INR BLD: 0.89 (ref 0.85–1.13)
MCH RBC QN AUTO: 32.4 PG (ref 26–33)
MCHC RBC AUTO-ENTMCNC: 33.3 GM/DL (ref 32.2–35.5)
MCV RBC AUTO: 97.1 FL (ref 80–94)
MRSA SCREEN RT-PCR: NEGATIVE
PLATELET # BLD: 351 THOU/MM3 (ref 130–400)
PMV BLD AUTO: 11.3 FL (ref 9.4–12.4)
POTASSIUM SERPL-SCNC: 4.2 MEQ/L (ref 3.5–5.2)
RBC # BLD: 5.22 MILL/MM3 (ref 4.7–6.1)
SODIUM BLD-SCNC: 143 MEQ/L (ref 135–145)
WBC # BLD: 12.9 THOU/MM3 (ref 4.8–10.8)

## 2019-05-24 PROCEDURE — 93010 ELECTROCARDIOGRAM REPORT: CPT | Performed by: INTERNAL MEDICINE

## 2019-05-24 PROCEDURE — 36415 COLL VENOUS BLD VENIPUNCTURE: CPT

## 2019-05-24 PROCEDURE — 85027 COMPLETE CBC AUTOMATED: CPT

## 2019-05-24 PROCEDURE — 85610 PROTHROMBIN TIME: CPT

## 2019-05-24 PROCEDURE — 80048 BASIC METABOLIC PNL TOTAL CA: CPT

## 2019-05-24 PROCEDURE — 93005 ELECTROCARDIOGRAM TRACING: CPT | Performed by: ORTHOPAEDIC SURGERY

## 2019-05-24 PROCEDURE — 87641 MR-STAPH DNA AMP PROBE: CPT

## 2019-05-24 PROCEDURE — 83036 HEMOGLOBIN GLYCOSYLATED A1C: CPT

## 2019-05-24 PROCEDURE — 71046 X-RAY EXAM CHEST 2 VIEWS: CPT

## 2019-06-03 ENCOUNTER — OFFICE VISIT (OUTPATIENT)
Dept: FAMILY MEDICINE CLINIC | Age: 49
End: 2019-06-03
Payer: COMMERCIAL

## 2019-06-03 VITALS
SYSTOLIC BLOOD PRESSURE: 124 MMHG | DIASTOLIC BLOOD PRESSURE: 76 MMHG | RESPIRATION RATE: 14 BRPM | HEART RATE: 80 BPM | BODY MASS INDEX: 32.8 KG/M2 | HEIGHT: 67 IN | TEMPERATURE: 98.3 F | WEIGHT: 209 LBS

## 2019-06-03 DIAGNOSIS — M48.02 CERVICAL SPINAL STENOSIS: ICD-10-CM

## 2019-06-03 DIAGNOSIS — Z01.818 PREOP EXAMINATION: Primary | ICD-10-CM

## 2019-06-03 DIAGNOSIS — G47.33 OSA (OBSTRUCTIVE SLEEP APNEA): ICD-10-CM

## 2019-06-03 DIAGNOSIS — E10.69 TYPE 1 DIABETES MELLITUS WITH OTHER SPECIFIED COMPLICATION (HCC): ICD-10-CM

## 2019-06-03 DIAGNOSIS — F17.210 CIGARETTE NICOTINE DEPENDENCE WITHOUT COMPLICATION: ICD-10-CM

## 2019-06-03 PROCEDURE — G8427 DOCREV CUR MEDS BY ELIG CLIN: HCPCS | Performed by: FAMILY MEDICINE

## 2019-06-03 PROCEDURE — G8417 CALC BMI ABV UP PARAM F/U: HCPCS | Performed by: FAMILY MEDICINE

## 2019-06-03 PROCEDURE — 2022F DILAT RTA XM EVC RTNOPTHY: CPT | Performed by: FAMILY MEDICINE

## 2019-06-03 PROCEDURE — 99242 OFF/OP CONSLTJ NEW/EST SF 20: CPT | Performed by: FAMILY MEDICINE

## 2019-06-03 ASSESSMENT — PATIENT HEALTH QUESTIONNAIRE - PHQ9
SUM OF ALL RESPONSES TO PHQ9 QUESTIONS 1 & 2: 0
2. FEELING DOWN, DEPRESSED OR HOPELESS: 0
SUM OF ALL RESPONSES TO PHQ QUESTIONS 1-9: 0
1. LITTLE INTEREST OR PLEASURE IN DOING THINGS: 0
SUM OF ALL RESPONSES TO PHQ QUESTIONS 1-9: 0

## 2019-06-03 NOTE — PROGRESS NOTES
Mohit Eid is a 54 y.o. male that presents for Pre-op Exam (neck 06/11/19 Dr. Guanako Guillermo )      Per 2014 ACC/AHA guidelines, patient may proceed with planned surgery. Patient has a Wood Cardiac Risk Index score of 0 indicating 1% chance of perioperative cardiac complications. He will require standard VTE prophylaxis. He will take 25 units of Lantus the morning of the surgery and hold his Humalog the morning of the surgery. At the request of Dr. Deandra Mclaughlin presents for pre-operative evaluation for his surgery. Planned Surgery: C4-7 ACDF on date 6/11/19. States that he is having a lot of right neck and arm pain. Notes taht through the day, his right arm will go numb and he will having pain/weakness in the arm. Patient Active Problem List    Diagnosis Date Noted    RANDEE (obstructive sleep apnea) 05/01/2017    Daytime sleepiness 05/01/2017    Snoring 05/01/2017    Hypercholesterolemia 05/23/2013    Obesity (BMI 30.0-34.9) 05/23/2013    Type 1 diabetes mellitus ([facility] [address] 01/31/2011    Erectile dysfunction 01/31/2011       PREOPERATIVE FAMILY HISTORY  - He reports that he does not have a history of bad reaction to anesthesia. -he does not have a family history of bleeding problems such as hemophilia, or Hari disease, or von Willebrand disease. PREOPERATIVE ALLERGIES QUESTION:  Allergies   Allergen Reactions    Atorvastatin Other (See Comments)     Myalgias    Pravastatin Other (See Comments)     Myalgias     he does not have a history of rash or swelling from exposure to rubber or latex.     PREOPERATIVE MEDICATION QUESTION:  Current Outpatient Medications   Medication Sig Dispense Refill    metFORMIN (GLUCOPHAGE) 500 MG tablet Take 500 mg by mouth daily      LANTUS SOLOSTAR 100 UNIT/ML injection pen Inject 70 Units into the skin every morning (before breakfast) 15 pen 5    insulin lispro (HUMALOG KWIKPEN) 100 UNIT/ML pen INJECT 2 UNITS OF INSULIN FOR EVERY 15 GRAMS OF CARBOHYDRATES 15:1 SLIDING SCALE 15 pen 5    varenicline (CHANTIX STARTING MONTH PAK) 0.5 MG X 11 & 1 MG X 42 tablet Take by mouth as directed 42 tablet 0    tadalafil (CIALIS) 20 MG tablet Take 1 tablet by mouth daily as needed for Erectile Dysfunction 30 tablet 2    glucagon (GLUCAGON EMERGENCY) 1 MG injection Infuse 1 mg intravenously once for 1 dose 1 kit 3    glucose blood VI test strips (YO CONTOUR NEXT TEST) strip Pt testing blood sugar 4 times daily. 200 strip 3    Insulin Pen Needle (PEN NEEDLES 5/16\") 30G X 8 MM MISC Use 5x times per day with insulin 450 each 5     No current facility-administered medications for this visit. he has not been taking systemic glucocorticoid this past year    CARDIAC RISK FACTORS  he does not have a history of UA or MI within that past 30 days  he does not have decompensated CHF or significant valvular disease  he does not have a history of significant cardiac arrhythmia  he denies chest pain, exertional dyspnea, orthopnea, palpitations or LE edema. he can walk up 1 flight of stairs or 8 steps without stopping (4 METS)    he does not have a history of CAD  he does not have a history of CHF  he does not have a history of CVA or TIA  he does have a history of DM requiring insulin  he does not have a history of Renal insufficiency (Cr > 2.0)    EKG reveals sinus rhythm with a rate of 97. There are not Q waves and are not ST segment changes. http://circ. ahajournals. org/content/116/17/e418/F2. large.jpg    PREOPERATIVE REVIEW OF SYSTEMS:  he does not have a history of MRSA or VRE. he does not have a history of seizures. he does not have a history of phlebitis or blood clots in arms or legs. he does have a history of sleep apnea. General/Constitutional:  Negative for fever, chills, fatigue, recent weight gain or loss. HEENT:  Negative for changes in vision, ear pain, nose pain, sore throat, dysphagia or odynophagia.     Cardiovascular:  Negative for palpitations, chest pain, dyspnea on exertion, or orthopnea   Respiratory:  Negative for  cough, hemoptysis, dyspnea or wheezing. Gastrointestinal:  Negative for abdominal pain, nausea, vomiting, diarrhea, constipation or changes in bowel habits. Genitourinary: Negative for dysuria or hematuria. No frequency, urgency, or hesitancy. Neurological: Negative for dizziness, syncope, focal weakness, paresthesias or headaches. Psychiatric: Negative for depression, anxiety, SI/HI   Skin: Negative for acute skin lesions. PHYSICAL EXAM:  Blood pressure 124/76, pulse 80, temperature 98.3 °F (36.8 °C), temperature source Oral, resp. rate 14, height 5' 7\" (1.702 m), weight 209 lb (94.8 kg). GEN: No acute distress  HEENT:  NCAT, PERRL, EOMI, Nares clear, turbinates pink, mucosa is moist.  Oropharynx  is clear. Hearing grossly intact. Dentition is normal for age. Neck: No lymphadenopathy or masses. Thyroid not palpable; no nodules or masses. Heart: RRR. S1 and S2 normal, no murmurs, clicks, gallops or rubs. No carotid bruits noted. Lungs:  CTAB,  No wheezing, ronchi, or rales. Normal symmetric air entry throughout both lung fields. Abdomen:  Soft, non tender, non distended. No rebound or guarding. No organomegaly. Extremities:  No gross deformity, erythema or edema of the lower extremities. Skin: No pathologic lesions or significant rash. Psych:  Affect appropriate. Thought process is normal without evidence of depression or psychosis. Good insight and appropriae interaction. Cognition and memory appear to be intact. ASSESSMENT & PLAN  Dino Edmonds was seen today for pre-op exam.    Diagnoses and all orders for this visit:    Preop examination    Type 1 diabetes mellitus with other specified complication (HCC)    Cigarette nicotine dependence without complication    RANDEE (obstructive sleep apnea)    Cervical spinal stenosis        No follow-ups on file.     Per 2014 ACC/AHA guidelines,

## 2019-09-27 DIAGNOSIS — N52.9 ERECTILE DYSFUNCTION, UNSPECIFIED ERECTILE DYSFUNCTION TYPE: ICD-10-CM

## 2019-09-27 DIAGNOSIS — E10.69 TYPE 1 DIABETES MELLITUS WITH OTHER SPECIFIED COMPLICATION (HCC): ICD-10-CM

## 2019-09-27 RX ORDER — PEN NEEDLE, DIABETIC 30 GX5/16"
NEEDLE, DISPOSABLE MISCELLANEOUS
Qty: 450 EACH | Refills: 5 | Status: SHIPPED | OUTPATIENT
Start: 2019-09-27 | End: 2019-11-07 | Stop reason: SDUPTHER

## 2019-09-27 RX ORDER — TADALAFIL 20 MG/1
20 TABLET ORAL DAILY PRN
Qty: 30 TABLET | Refills: 2 | Status: SHIPPED | OUTPATIENT
Start: 2019-09-27 | End: 2020-12-30

## 2019-09-27 RX ORDER — TADALAFIL 20 MG/1
20 TABLET ORAL DAILY PRN
Qty: 30 TABLET | Refills: 2 | Status: SHIPPED | OUTPATIENT
Start: 2019-09-27 | End: 2019-11-07 | Stop reason: SDUPTHER

## 2019-09-27 RX ORDER — INSULIN GLARGINE 100 [IU]/ML
70 INJECTION, SOLUTION SUBCUTANEOUS
Qty: 15 PEN | Refills: 5 | Status: SHIPPED | OUTPATIENT
Start: 2019-09-27 | End: 2019-10-02

## 2019-09-27 RX ORDER — INSULIN GLARGINE 100 [IU]/ML
70 INJECTION, SOLUTION SUBCUTANEOUS
Qty: 15 PEN | Refills: 5 | Status: SHIPPED | OUTPATIENT
Start: 2019-09-27 | End: 2019-10-01 | Stop reason: SDUPTHER

## 2019-10-01 DIAGNOSIS — E10.69 TYPE 1 DIABETES MELLITUS WITH OTHER SPECIFIED COMPLICATION (HCC): ICD-10-CM

## 2019-10-02 RX ORDER — INSULIN GLARGINE 100 [IU]/ML
70 INJECTION, SOLUTION SUBCUTANEOUS
Qty: 63 ML | Refills: 3 | Status: SHIPPED | OUTPATIENT
Start: 2019-10-02 | End: 2019-11-07 | Stop reason: SDUPTHER

## 2019-11-07 ENCOUNTER — OFFICE VISIT (OUTPATIENT)
Dept: FAMILY MEDICINE CLINIC | Age: 49
End: 2019-11-07
Payer: COMMERCIAL

## 2019-11-07 VITALS
HEART RATE: 92 BPM | HEIGHT: 67 IN | RESPIRATION RATE: 10 BRPM | TEMPERATURE: 98.5 F | DIASTOLIC BLOOD PRESSURE: 76 MMHG | WEIGHT: 209 LBS | BODY MASS INDEX: 32.8 KG/M2 | SYSTOLIC BLOOD PRESSURE: 138 MMHG

## 2019-11-07 DIAGNOSIS — N52.9 ERECTILE DYSFUNCTION, UNSPECIFIED ERECTILE DYSFUNCTION TYPE: ICD-10-CM

## 2019-11-07 DIAGNOSIS — Z23 NEED FOR IMMUNIZATION AGAINST TETANUS ALONE: ICD-10-CM

## 2019-11-07 DIAGNOSIS — F17.210 CIGARETTE NICOTINE DEPENDENCE WITHOUT COMPLICATION: ICD-10-CM

## 2019-11-07 DIAGNOSIS — E10.69 TYPE 1 DIABETES MELLITUS WITH OTHER SPECIFIED COMPLICATION (HCC): Primary | ICD-10-CM

## 2019-11-07 LAB — HBA1C MFR BLD: 9.1 %

## 2019-11-07 PROCEDURE — G8417 CALC BMI ABV UP PARAM F/U: HCPCS | Performed by: FAMILY MEDICINE

## 2019-11-07 PROCEDURE — 4004F PT TOBACCO SCREEN RCVD TLK: CPT | Performed by: FAMILY MEDICINE

## 2019-11-07 PROCEDURE — 2022F DILAT RTA XM EVC RTNOPTHY: CPT | Performed by: FAMILY MEDICINE

## 2019-11-07 PROCEDURE — 3046F HEMOGLOBIN A1C LEVEL >9.0%: CPT | Performed by: FAMILY MEDICINE

## 2019-11-07 PROCEDURE — 90471 IMMUNIZATION ADMIN: CPT | Performed by: FAMILY MEDICINE

## 2019-11-07 PROCEDURE — G8427 DOCREV CUR MEDS BY ELIG CLIN: HCPCS | Performed by: FAMILY MEDICINE

## 2019-11-07 PROCEDURE — 90715 TDAP VACCINE 7 YRS/> IM: CPT | Performed by: FAMILY MEDICINE

## 2019-11-07 PROCEDURE — 83036 HEMOGLOBIN GLYCOSYLATED A1C: CPT | Performed by: FAMILY MEDICINE

## 2019-11-07 PROCEDURE — G8484 FLU IMMUNIZE NO ADMIN: HCPCS | Performed by: FAMILY MEDICINE

## 2019-11-07 PROCEDURE — 99214 OFFICE O/P EST MOD 30 MIN: CPT | Performed by: FAMILY MEDICINE

## 2019-11-07 RX ORDER — PEN NEEDLE, DIABETIC 30 GX5/16"
NEEDLE, DISPOSABLE MISCELLANEOUS
Qty: 450 EACH | Refills: 5 | Status: SHIPPED | OUTPATIENT
Start: 2019-11-07 | End: 2022-03-08 | Stop reason: SDUPTHER

## 2019-11-07 RX ORDER — INSULIN GLARGINE 100 [IU]/ML
70 INJECTION, SOLUTION SUBCUTANEOUS
Qty: 63 ML | Refills: 3 | Status: SHIPPED | OUTPATIENT
Start: 2019-11-07 | End: 2020-12-30 | Stop reason: SDUPTHER

## 2019-11-07 RX ORDER — INSULIN LISPRO 100 [IU]/ML
INJECTION, SOLUTION INTRAVENOUS; SUBCUTANEOUS
Qty: 15 PEN | Refills: 5 | Status: SHIPPED | OUTPATIENT
Start: 2019-11-07 | End: 2019-11-08 | Stop reason: SDUPTHER

## 2019-11-07 RX ORDER — SILDENAFIL 100 MG/1
100 TABLET, FILM COATED ORAL PRN
Qty: 15 TABLET | Refills: 2 | Status: SHIPPED | OUTPATIENT
Start: 2019-11-07 | End: 2020-12-30 | Stop reason: SDUPTHER

## 2019-11-08 DIAGNOSIS — E10.69 TYPE 1 DIABETES MELLITUS WITH OTHER SPECIFIED COMPLICATION (HCC): ICD-10-CM

## 2019-11-08 RX ORDER — INSULIN LISPRO 100 [IU]/ML
INJECTION, SOLUTION INTRAVENOUS; SUBCUTANEOUS
Qty: 45 PEN | Refills: 3 | Status: SHIPPED | OUTPATIENT
Start: 2019-11-08 | End: 2022-03-08 | Stop reason: SDUPTHER

## 2019-11-18 DIAGNOSIS — E10.69 TYPE 1 DIABETES MELLITUS WITH OTHER SPECIFIED COMPLICATION (HCC): ICD-10-CM

## 2020-02-01 ENCOUNTER — HOSPITAL ENCOUNTER (EMERGENCY)
Age: 50
Discharge: HOME | End: 2020-02-01
Attending: FAMILY MEDICINE
Payer: COMMERCIAL

## 2020-02-01 VITALS
HEIGHT: 67 IN | DIASTOLIC BLOOD PRESSURE: 81 MMHG | OXYGEN SATURATION: 96 % | RESPIRATION RATE: 16 BRPM | BODY MASS INDEX: 29.82 KG/M2 | TEMPERATURE: 98.5 F | WEIGHT: 190 LBS | SYSTOLIC BLOOD PRESSURE: 145 MMHG | HEART RATE: 105 BPM

## 2020-02-01 LAB
ALBUMIN SERPL-MCNC: 4.2 G/DL (ref 3.5–5.1)
ALP BLD-CCNC: 103 U/L (ref 38–126)
ALT SERPL-CCNC: 38 U/L (ref 11–66)
ANION GAP SERPL CALCULATED.3IONS-SCNC: 12 MEQ/L (ref 8–16)
AST SERPL-CCNC: 24 U/L (ref 5–40)
BASOPHILS # BLD: 0.3 %
BASOPHILS ABSOLUTE: 0 THOU/MM3 (ref 0–0.1)
BILIRUB SERPL-MCNC: 0.4 MG/DL (ref 0.3–1.2)
BUN BLDV-MCNC: 10 MG/DL (ref 7–22)
CALCIUM SERPL-MCNC: 9.2 MG/DL (ref 8.5–10.5)
CHLORIDE BLD-SCNC: 96 MEQ/L (ref 98–111)
CO2: 25 MEQ/L (ref 23–33)
CREAT SERPL-MCNC: 0.6 MG/DL (ref 0.4–1.2)
EOSINOPHIL # BLD: 0.6 %
EOSINOPHILS ABSOLUTE: 0.1 THOU/MM3 (ref 0–0.4)
ERYTHROCYTE [DISTWIDTH] IN BLOOD BY AUTOMATED COUNT: 13.2 % (ref 11.5–14.5)
ERYTHROCYTE [DISTWIDTH] IN BLOOD BY AUTOMATED COUNT: 45.8 FL (ref 35–45)
GFR SERPL CREATININE-BSD FRML MDRD: > 90 ML/MIN/1.73M2
GLUCOSE BLD-MCNC: 122 MG/DL (ref 70–108)
GLUCOSE BLD-MCNC: 138 MG/DL (ref 70–108)
HCT VFR BLD CALC: 46.7 % (ref 42–52)
HEMOGLOBIN: 15.6 GM/DL (ref 14–18)
IMMATURE GRANS (ABS): 0.04 THOU/MM3 (ref 0–0.07)
IMMATURE GRANULOCYTES: 0.3 %
LYMPHOCYTES # BLD: 13.7 %
LYMPHOCYTES ABSOLUTE: 1.7 THOU/MM3 (ref 1–4.8)
MCH RBC QN AUTO: 31.8 PG (ref 26–33)
MCHC RBC AUTO-ENTMCNC: 33.4 GM/DL (ref 32.2–35.5)
MCV RBC AUTO: 95.1 FL (ref 80–94)
MONOCYTES # BLD: 5.3 %
MONOCYTES ABSOLUTE: 0.7 THOU/MM3 (ref 0.4–1.3)
NUCLEATED RED BLOOD CELLS: 0 /100 WBC
OSMOLALITY CALCULATION: 267.6 MOSMOL/KG (ref 275–300)
PLATELET # BLD: 307 THOU/MM3 (ref 130–400)
PMV BLD AUTO: 10.1 FL (ref 9.4–12.4)
POTASSIUM REFLEX MAGNESIUM: 4.4 MEQ/L (ref 3.5–5.2)
RBC # BLD: 4.91 MILL/MM3 (ref 4.7–6.1)
SEG NEUTROPHILS: 79.8 %
SEGMENTED NEUTROPHILS ABSOLUTE COUNT: 9.9 THOU/MM3 (ref 1.8–7.7)
SODIUM BLD-SCNC: 133 MEQ/L (ref 135–145)
TOTAL PROTEIN: 7.3 G/DL (ref 6.1–8)
WBC # BLD: 12.4 THOU/MM3 (ref 4.8–10.8)

## 2020-02-01 PROCEDURE — 80053 COMPREHEN METABOLIC PANEL: CPT

## 2020-02-01 PROCEDURE — 36415 COLL VENOUS BLD VENIPUNCTURE: CPT

## 2020-02-01 PROCEDURE — 2580000003 HC RX 258: Performed by: FAMILY MEDICINE

## 2020-02-01 PROCEDURE — 82948 REAGENT STRIP/BLOOD GLUCOSE: CPT

## 2020-02-01 PROCEDURE — 99285 EMERGENCY DEPT VISIT HI MDM: CPT

## 2020-02-01 PROCEDURE — 85025 COMPLETE CBC W/AUTO DIFF WBC: CPT

## 2020-02-01 RX ORDER — 0.9 % SODIUM CHLORIDE 0.9 %
1000 INTRAVENOUS SOLUTION INTRAVENOUS ONCE
Status: COMPLETED | OUTPATIENT
Start: 2020-02-01 | End: 2020-02-01

## 2020-02-01 RX ADMIN — SODIUM CHLORIDE 1000 ML: 9 INJECTION, SOLUTION INTRAVENOUS at 16:53

## 2020-02-01 NOTE — ED NOTES
Patient presents to ED via EMS from home for hypoglycemia. EMS reports upon arrival patient's glucose was 38. EMS administered patient's home glucagon, 1mg. States en route to ER gluocose was 92, than 151. On arrival to ED patient is A&O x4 but states he does not remember how much he ate today or how much insulin he took. Denies any pain. Given OJ and peanut butter crackers. Glucose on arrival 122. Shows no signs of distress. Skin warm and dry. Respirations easy and unlabored.       Mya Fisher, [address] [place] [address]  02/01/20 0392

## 2020-02-01 NOTE — ED PROVIDER NOTES
EMERGENCY DEPARTMENT ENCOUNTER     CHIEF COMPLAINT   Chief Complaint   Patient presents with    Hypoglycemia        HPI   Rip Kenyon is a 54 y.o. male with a history of diabetes who presents with an episode of hypoglycemia leading to syncope. Onset was PTA. The duration has been constant since the onset. Pt did not have breakfast or lunch today, felt weak and passed out. Glucose check was low, but he was unable to self administer glucagon IM injection prior to EMS arrival. EMS did give him glucagon, with  on scene, now 90. The location of the discomfort is none. The patient has associated generalized weakness due to the hypoglycemia. There are no alleviating factors. REVIEW OF SYSTEMS   General: Denies fevers or chills, +generalized weakness  Eye: no eye pain, no tearing or blurry vision  Chest: normal rhythm, mild tachycardic rate, no m/r/g  Respiratory: denies SOB, cough or sputum  Back: no back pain  GI: denies nausea; denies vomiting or diarrhea  Skin: Denies new rash  Endo: +hypoglycemia  All other review of systems negative except where noted in HPI above    PAST MEDICAL and SURGICAL HISTORY   Past Medical History:   Diagnosis Date    Hyperlipidemia 2012    Type 1 diabetes mellitus ([address] 1995    at age 27     History reviewed. No pertinent surgical history.      CURRENT MEDICATIONS   Current Outpatient Rx   Medication Sig Dispense Refill    metFORMIN (GLUCOPHAGE) 500 MG tablet Take 1 tablet by mouth daily 90 tablet 3    insulin lispro, 1 Unit Dial, (HUMALOG KWIKPEN) 100 UNIT/ML SOPN INJECT 2 UNITS OF INSULIN FOR EVERY 15 GRAMS OF CARBOHYDRATES 15:1 SLIDING SCALE 45 pen 3    Insulin Pen Needle (PEN NEEDLES 5/16\") 30G X 8 MM MISC Use 5x times per day with insulin 450 each 5    LANTUS SOLOSTAR 100 UNIT/ML injection pen Inject 70 Units into the skin every morning (before breakfast) 63 mL 3    sildenafil (VIAGRA) 100 MG tablet Take 1 tablet by mouth as needed for Erectile Dysfunction 15 tablet Physically abused: None     Forced sexual activity: None   Other Topics Concern    None   Social History Narrative    None     Family History   Problem Relation Age of Onset    Asthma Mother     High Blood Pressure Father 48    Asthma Sister         Labs Reviewed   CBC WITH AUTO DIFFERENTIAL - Abnormal; Notable for the following components:       Result Value    WBC 12.4 (*)     MCV 95.1 (*)     RDW-SD 45.8 (*)     Segs Absolute 9.9 (*)     All other components within normal limits   COMPREHENSIVE METABOLIC PANEL W/ REFLEX TO MG FOR LOW K - Abnormal; Notable for the following components:    Glucose 138 (*)     Sodium 133 (*)     Chloride 96 (*)     All other components within normal limits   OSMOLALITY - Abnormal; Notable for the following components:    Osmolality Calc 267.6 (*)     All other components within normal limits   POCT GLUCOSE - Abnormal; Notable for the following components:    POC Glucose 122 (*)     All other components within normal limits   ANION GAP   GLOMERULAR FILTRATION RATE, ESTIMATED   URINALYSIS       No orders to display       PHYSICAL EXAM   VITAL SIGNS:   Vitals:    02/01/20 1648   BP: (!) 145/81   Pulse: 105   Resp: 16   Temp: 98.5 °F (36.9 °C)   TempSrc: Oral   SpO2: 96%   Weight: 190 lb (86.2 kg)   Height: 5' 7\" (1.702 m)     Constitutional: Well developed, well nourished, no acute distress,   Eyes: Pupils equally round and reactive to light, sclerae nonicteric, conjunctiva moist  HENT: Atraumatic, external ears normal, nose normal, oropharynx moist, no pharyngeal exudates.    Neck: Supple, no neck swelling   Abdomen: soft, non-tender, normal bowel sounds  Respiratory: lungs clear bilaterally to ascultation; no tachypnea  Cardiovascular: Normal rhythm, mild tachycardic rate, no m/r/g, No JVD  Integument: Warm dry skin, no obvious rash  Neurologic: No slurred speech, normal gait     RADIOLOGY/PROCEDURES     ED COURSE & MEDICAL DECISION MAKING   Pertinent Labs & Imaging studies

## 2020-09-21 ENCOUNTER — TELEPHONE (OUTPATIENT)
Dept: FAMILY MEDICINE CLINIC | Age: 50
End: 2020-09-21

## 2020-09-21 NOTE — TELEPHONE ENCOUNTER
DO TRICIA [facility] Clinical Support    Caller: Unspecified (Today,  7:56 AM)               Please contact patient and schedule a follow up OV for his diabetes

## 2020-12-30 ENCOUNTER — OFFICE VISIT (OUTPATIENT)
Dept: FAMILY MEDICINE CLINIC | Age: 50
End: 2020-12-30
Payer: COMMERCIAL

## 2020-12-30 VITALS
WEIGHT: 204 LBS | SYSTOLIC BLOOD PRESSURE: 132 MMHG | BODY MASS INDEX: 32.02 KG/M2 | DIASTOLIC BLOOD PRESSURE: 84 MMHG | TEMPERATURE: 97.8 F | RESPIRATION RATE: 18 BRPM | HEIGHT: 67 IN | HEART RATE: 88 BPM | OXYGEN SATURATION: 98 %

## 2020-12-30 LAB
HBA1C MFR BLD: 8.5 % (ref 4.3–5.7)
MICROALB/CREAT RATIO POC: ABNORMAL MG/G
MICROALBUMIN/CREAT UR-RTO: 80 MG/L
POC CREATININE: 300 MG/DL

## 2020-12-30 PROCEDURE — G8484 FLU IMMUNIZE NO ADMIN: HCPCS | Performed by: FAMILY MEDICINE

## 2020-12-30 PROCEDURE — 99396 PREV VISIT EST AGE 40-64: CPT | Performed by: FAMILY MEDICINE

## 2020-12-30 PROCEDURE — 82044 UR ALBUMIN SEMIQUANTITATIVE: CPT | Performed by: FAMILY MEDICINE

## 2020-12-30 RX ORDER — SILDENAFIL 100 MG/1
100 TABLET, FILM COATED ORAL PRN
Qty: 15 TABLET | Refills: 2 | Status: SHIPPED | OUTPATIENT
Start: 2020-12-30 | End: 2022-03-08 | Stop reason: SDUPTHER

## 2020-12-30 RX ORDER — INSULIN GLARGINE 100 [IU]/ML
70 INJECTION, SOLUTION SUBCUTANEOUS
Qty: 63 ML | Refills: 3 | Status: SHIPPED | OUTPATIENT
Start: 2020-12-30 | End: 2021-11-22

## 2020-12-30 SDOH — ECONOMIC STABILITY: TRANSPORTATION INSECURITY
IN THE PAST 12 MONTHS, HAS LACK OF TRANSPORTATION KEPT YOU FROM MEETINGS, WORK, OR FROM GETTING THINGS NEEDED FOR DAILY LIVING?: NO

## 2020-12-30 SDOH — ECONOMIC STABILITY: FOOD INSECURITY: WITHIN THE PAST 12 MONTHS, YOU WORRIED THAT YOUR FOOD WOULD RUN OUT BEFORE YOU GOT MONEY TO BUY MORE.: NEVER TRUE

## 2020-12-30 SDOH — ECONOMIC STABILITY: INCOME INSECURITY: HOW HARD IS IT FOR YOU TO PAY FOR THE VERY BASICS LIKE FOOD, HOUSING, MEDICAL CARE, AND HEATING?: NOT HARD AT ALL

## 2020-12-30 SDOH — ECONOMIC STABILITY: FOOD INSECURITY: WITHIN THE PAST 12 MONTHS, THE FOOD YOU BOUGHT JUST DIDN'T LAST AND YOU DIDN'T HAVE MONEY TO GET MORE.: NEVER TRUE

## 2020-12-30 SDOH — ECONOMIC STABILITY: TRANSPORTATION INSECURITY
IN THE PAST 12 MONTHS, HAS THE LACK OF TRANSPORTATION KEPT YOU FROM MEDICAL APPOINTMENTS OR FROM GETTING MEDICATIONS?: NO

## 2020-12-30 ASSESSMENT — PATIENT HEALTH QUESTIONNAIRE - PHQ9
SUM OF ALL RESPONSES TO PHQ QUESTIONS 1-9: 0
SUM OF ALL RESPONSES TO PHQ9 QUESTIONS 1 & 2: 0
1. LITTLE INTEREST OR PLEASURE IN DOING THINGS: 0
2. FEELING DOWN, DEPRESSED OR HOPELESS: 0
DEPRESSION UNABLE TO ASSESS: FUNCTIONAL CAPACITY MOTIVATION LIMITS ACCURACY

## 2020-12-30 NOTE — PROGRESS NOTES
Az Pittman is a 52 y.o. male that presents for     Chief Complaint   Patient presents with    Other     [facility]         /84   Pulse 88   Temp 97.8 °F (36.6 °C)   Resp 18   Ht 5' 7\" (1.702 m)   Wt 204 lb (92.5 kg)   SpO2 98%   BMI 31.95 kg/m²       HPI:    Diabetes Type 1      Glucose control:   Does patient check blood glucoses at home? Yes - sugars have been 'up and down', notes that he has a strange schedule at work and this is affecting his eating habits. Highest readings have been in the mid 300s. He does report that he is 'eating some things I shouldn't have' recently. Report of hypoglycemia: no  Lab Results   Component Value Date    LABA1C 8.5 (H) 12/30/2020     No results found for: EAG    Symptoms  Polyuria, Polydipsia or Polyphagia? No  Chest Pain, SOB, or Palpitations? -  No  New Vision complaints? No  Paresthesias of the extremities? No    Medications  Current medication were reviewed. Compliant with medications?  but does have some difficulty getting in some of his humalog doses. Has looked into getting a pump, but does not feel this is feasible for him. He has not been taking his metformin recently, says that he does not like taking pills reguarly. Medication side effects? No  On ACE-I or ARB? No  On antiplatelet therapy? No  On Statin? No      Exercise  Exercise?  No  Wt Readings from Last 3 Encounters:   12/30/20 204 lb (92.5 kg)   02/01/20 190 lb (86.2 kg)   11/07/19 209 lb (94.8 kg)       Diet discipline?:  Low salt, fat, sugar diet?  no    Blood pressure control:  BP Readings from Last 3 Encounters:   12/30/20 132/84   02/01/20 (!) 145/81   11/07/19 138/76       Lab Results   Component Value Date    LABMICR 0.70 07/29/2013       Lab Results   Component Value Date    LDLCALC 165 06/09/2018         Health Maintenance   Topic Date Due    Hepatitis C screen  1970    Hepatitis B vaccine (1 of 3 - Risk 3-dose series) 03/31/1991  Diabetic retinal exam  06/27/2015    Diabetic foot exam  04/01/2017    Lipid screen  06/09/2019    A1C test (Diabetic or Prediabetic)  12/30/2021    Diabetic microalbuminuria test  12/30/2021    DTaP/Tdap/Td vaccine (2 - Td) 11/07/2029    Flu vaccine  Completed    Pneumococcal 0-64 years Vaccine  Completed    HIV screen  Completed    Hepatitis A vaccine  Aged Out    Hib vaccine  Aged Out    Meningococcal (ACWY) vaccine  Aged Out       Past Medical History:   Diagnosis Date    Hyperlipidemia 2012    Type 1 diabetes mellitus ([facility] [address] 1995    at age 27       No past surgical history on file. Social History     Tobacco Use    Smoking status: Current Every Day Smoker     Packs/day: 1.00     Years: 15.00     Pack years: 15.00    Smokeless tobacco: Never Used   Substance Use Topics    Alcohol use: Yes     Comment: social    Drug use: No       Family History   Problem Relation Age of Onset    Asthma Mother     High Blood Pressure Father 48    Asthma Sister          I have reviewed the patient's past medical history, past surgical history, allergies, medications, social and family history and I have made updates where appropriate.     Review of Systems        PHYSICAL EXAM:  /84   Pulse 88   Temp 97.8 °F (36.6 °C)   Resp 18   Ht 5' 7\" (1.702 m)   Wt 204 lb (92.5 kg)   SpO2 98%   BMI 31.95 kg/m²     General Appearance: well developed and well- nourished, in no acute distress  Head: normocephalic and atraumatic  ENT: external ear and ear canal normal bilaterally, nose without deformity, nasal mucosa and turbinates normal without polyps, oropharynx normal, dentition is normal for age, no lipor gum lesions noted  Neck: supple and non-tender without mass, no thyromegaly or thyroid nodules, no cervical lymphadenopathy  Pulmonary/Chest: clear to auscultation bilaterally- no wheezes, rales or rhonchi, normal air movement, no respiratory distress or retractions Cardiovascular: normal rate, regular rhythm, normal S1 and S2, no murmurs, rubs, clicks, or gallops, distal pulses intact  Extremities: no cyanosis, clubbing or edema of the lower extremities  Psych:  Normal affect without evidence of depression oranxiety, insight and judgement are appropriate, memory appears intact  Skin: warm and dry, no rash or erythema      ASSESSMENT & PLAN  Remington Flood was seen today for other. Diagnoses and all orders for this visit:    Annual physical exam    Type 1 diabetes mellitus with other specified complication (HCC)  -     LANTUS SOLOSTAR 100 UNIT/ML injection pen; Inject 70 Units into the skin every morning (before breakfast)  -     metFORMIN (GLUCOPHAGE) 500 MG tablet; Take 1 tablet by mouth daily  -     POCT microalbumin  -     Comprehensive Metabolic Panel; Future  -     Lipid Panel; Future    Vasculogenic erectile dysfunction, unspecified vasculogenic erectile dysfunction type  -     sildenafil (VIAGRA) 100 MG tablet; Take 1 tablet by mouth as needed for Erectile Dysfunction    Other orders  -     POCT glycosylated hemoglobin (Hb A1C)    -Likely DM is type 1.5 as the metformin has helped, goal a1c is less than 8, continue with basal bolus regimen, encouraged to take metformin regularly  -Other chronic issues are stable, continue current medications  -Advised to call if any issues    Counseling was provided today regarding the following topics: Healthy eating habits, Regular exercise, substance abuse and healthy sleep habits. Return in about 6 months (around 6/30/2021), or if symptoms worsen or fail to improve. Controlled Substances Monitoring:                     Remington Flood received counseling on the following healthy behaviors: medication adherence  Reviewed prior labs and health maintenance. Continue current medications, diet and exercise. Discussed use, benefit, and side effects of prescribed medications. Barriers to medication compliance addressed. Patient given educational materials - see patient instructions. All patient questions answered. Patient voiced understanding.

## 2021-02-21 ENCOUNTER — HOSPITAL ENCOUNTER (EMERGENCY)
Age: 51
Discharge: LEFT BEFORE DISCHARGE | End: 2021-02-21
Attending: EMERGENCY MEDICINE
Payer: COMMERCIAL

## 2021-02-21 ENCOUNTER — APPOINTMENT (OUTPATIENT)
Dept: CT IMAGING | Age: 51
End: 2021-02-21
Payer: COMMERCIAL

## 2021-02-21 VITALS
RESPIRATION RATE: 16 BRPM | OXYGEN SATURATION: 97 % | SYSTOLIC BLOOD PRESSURE: 148 MMHG | HEART RATE: 102 BPM | TEMPERATURE: 99.1 F | DIASTOLIC BLOOD PRESSURE: 89 MMHG

## 2021-02-21 DIAGNOSIS — E16.2 HYPOGLYCEMIA: Primary | ICD-10-CM

## 2021-02-21 LAB
ALBUMIN SERPL-MCNC: 3.9 G/DL (ref 3.5–5.1)
ALP BLD-CCNC: 98 U/L (ref 38–126)
ALT SERPL-CCNC: 30 U/L (ref 11–66)
ANION GAP SERPL CALCULATED.3IONS-SCNC: 11 MEQ/L (ref 8–16)
AST SERPL-CCNC: 23 U/L (ref 5–40)
BACTERIA: ABNORMAL /HPF
BASE EXCESS MIXED: 0.2 MMOL/L (ref -2–3)
BASOPHILS # BLD: 0.2 %
BASOPHILS ABSOLUTE: 0 THOU/MM3 (ref 0–0.1)
BILIRUB SERPL-MCNC: 0.4 MG/DL (ref 0.3–1.2)
BILIRUBIN URINE: NEGATIVE
BLOOD, URINE: NEGATIVE
BUN BLDV-MCNC: 13 MG/DL (ref 7–22)
C-REACTIVE PROTEIN: 0.4 MG/DL (ref 0–1)
CALCIUM SERPL-MCNC: 8.9 MG/DL (ref 8.5–10.5)
CASTS 2: ABNORMAL /LPF
CASTS UA: ABNORMAL /LPF
CHARACTER, URINE: CLEAR
CHLORIDE BLD-SCNC: 102 MEQ/L (ref 98–111)
CO2: 25 MEQ/L (ref 23–33)
COLLECTED BY:: ABNORMAL
COLOR: YELLOW
CREAT SERPL-MCNC: 0.7 MG/DL (ref 0.4–1.2)
CRYSTALS, UA: ABNORMAL
DEVICE: ABNORMAL
EKG ATRIAL RATE: 106 BPM
EKG P AXIS: 54 DEGREES
EKG P-R INTERVAL: 142 MS
EKG Q-T INTERVAL: 314 MS
EKG QRS DURATION: 92 MS
EKG QTC CALCULATION (BAZETT): 417 MS
EKG R AXIS: 107 DEGREES
EKG T AXIS: 48 DEGREES
EKG VENTRICULAR RATE: 106 BPM
EOSINOPHIL # BLD: 0.1 %
EOSINOPHILS ABSOLUTE: 0 THOU/MM3 (ref 0–0.4)
EPITHELIAL CELLS, UA: ABNORMAL /HPF
ERYTHROCYTE [DISTWIDTH] IN BLOOD BY AUTOMATED COUNT: 12.6 % (ref 11.5–14.5)
ERYTHROCYTE [DISTWIDTH] IN BLOOD BY AUTOMATED COUNT: 44.5 FL (ref 35–45)
GFR SERPL CREATININE-BSD FRML MDRD: > 90 ML/MIN/1.73M2
GLUCOSE BLD-MCNC: 230 MG/DL (ref 70–108)
GLUCOSE BLD-MCNC: 237 MG/DL (ref 70–108)
GLUCOSE URINE: 500 MG/DL
HCO3, MIXED: 24 MMOL/L (ref 23–28)
HCT VFR BLD CALC: 46.6 % (ref 42–52)
HEMOGLOBIN: 15.4 GM/DL (ref 14–18)
IMMATURE GRANS (ABS): 0.11 THOU/MM3 (ref 0–0.07)
IMMATURE GRANULOCYTES: 0.6 %
INR BLD: 0.97 (ref 0.85–1.13)
KETONES, URINE: NEGATIVE
LACTIC ACID: 3 MMOL/L (ref 0.5–2.2)
LEUKOCYTE ESTERASE, URINE: NEGATIVE
LIPASE: 13.8 U/L (ref 5.6–51.3)
LYMPHOCYTES # BLD: 5.5 %
LYMPHOCYTES ABSOLUTE: 1.1 THOU/MM3 (ref 1–4.8)
MCH RBC QN AUTO: 31.6 PG (ref 26–33)
MCHC RBC AUTO-ENTMCNC: 33 GM/DL (ref 32.2–35.5)
MCV RBC AUTO: 95.7 FL (ref 80–94)
MISCELLANEOUS 2: ABNORMAL
MONOCYTES # BLD: 4.3 %
MONOCYTES ABSOLUTE: 0.9 THOU/MM3 (ref 0.4–1.3)
NITRITE, URINE: NEGATIVE
NUCLEATED RED BLOOD CELLS: 0 /100 WBC
O2 SAT, MIXED: 56 %
OSMOLALITY CALCULATION: 283.1 MOSMOL/KG (ref 275–300)
PCO2, MIXED VENOUS: 37 MMHG (ref 41–51)
PH UA: 8.5 (ref 5–9)
PH, MIXED: 7.42 (ref 7.31–7.41)
PLATELET # BLD: 302 THOU/MM3 (ref 130–400)
PMV BLD AUTO: 11 FL (ref 9.4–12.4)
PO2 MIXED: 29 MMHG (ref 25–40)
POTASSIUM REFLEX MAGNESIUM: 3.7 MEQ/L (ref 3.5–5.2)
PROTEIN UA: ABNORMAL
RBC # BLD: 4.87 MILL/MM3 (ref 4.7–6.1)
RBC URINE: ABNORMAL /HPF
RENAL EPITHELIAL, UA: ABNORMAL
SEG NEUTROPHILS: 89.3 %
SEGMENTED NEUTROPHILS ABSOLUTE COUNT: 17.8 THOU/MM3 (ref 1.8–7.7)
SODIUM BLD-SCNC: 138 MEQ/L (ref 135–145)
SPECIFIC GRAVITY, URINE: > 1.03 (ref 1–1.03)
TOTAL PROTEIN: 7.1 G/DL (ref 6.1–8)
TROPONIN T: < 0.01 NG/ML
UROBILINOGEN, URINE: 0.2 EU/DL (ref 0–1)
WBC # BLD: 19.9 THOU/MM3 (ref 4.8–10.8)
WBC UA: ABNORMAL /HPF
YEAST: ABNORMAL

## 2021-02-21 PROCEDURE — 74177 CT ABD & PELVIS W/CONTRAST: CPT

## 2021-02-21 PROCEDURE — 96372 THER/PROPH/DIAG INJ SC/IM: CPT

## 2021-02-21 PROCEDURE — 86140 C-REACTIVE PROTEIN: CPT

## 2021-02-21 PROCEDURE — 36415 COLL VENOUS BLD VENIPUNCTURE: CPT

## 2021-02-21 PROCEDURE — 99285 EMERGENCY DEPT VISIT HI MDM: CPT

## 2021-02-21 PROCEDURE — 99284 EMERGENCY DEPT VISIT MOD MDM: CPT

## 2021-02-21 PROCEDURE — 83605 ASSAY OF LACTIC ACID: CPT

## 2021-02-21 PROCEDURE — 2580000003 HC RX 258: Performed by: EMERGENCY MEDICINE

## 2021-02-21 PROCEDURE — 85610 PROTHROMBIN TIME: CPT

## 2021-02-21 PROCEDURE — 82803 BLOOD GASES ANY COMBINATION: CPT

## 2021-02-21 PROCEDURE — 84484 ASSAY OF TROPONIN QUANT: CPT

## 2021-02-21 PROCEDURE — 83690 ASSAY OF LIPASE: CPT

## 2021-02-21 PROCEDURE — 93005 ELECTROCARDIOGRAM TRACING: CPT | Performed by: EMERGENCY MEDICINE

## 2021-02-21 PROCEDURE — 6360000002 HC RX W HCPCS

## 2021-02-21 PROCEDURE — 81001 URINALYSIS AUTO W/SCOPE: CPT

## 2021-02-21 PROCEDURE — 70450 CT HEAD/BRAIN W/O DYE: CPT

## 2021-02-21 PROCEDURE — 6360000004 HC RX CONTRAST MEDICATION: Performed by: EMERGENCY MEDICINE

## 2021-02-21 PROCEDURE — 85025 COMPLETE CBC W/AUTO DIFF WBC: CPT

## 2021-02-21 PROCEDURE — 82948 REAGENT STRIP/BLOOD GLUCOSE: CPT

## 2021-02-21 PROCEDURE — 80053 COMPREHEN METABOLIC PANEL: CPT

## 2021-02-21 RX ORDER — LORAZEPAM 2 MG/ML
INJECTION INTRAMUSCULAR
Status: COMPLETED
Start: 2021-02-21 | End: 2021-02-21

## 2021-02-21 RX ORDER — 0.9 % SODIUM CHLORIDE 0.9 %
1000 INTRAVENOUS SOLUTION INTRAVENOUS ONCE
Status: COMPLETED | OUTPATIENT
Start: 2021-02-21 | End: 2021-02-21

## 2021-02-21 RX ORDER — LORAZEPAM 2 MG/ML
2 INJECTION INTRAMUSCULAR ONCE
Status: COMPLETED | OUTPATIENT
Start: 2021-02-21 | End: 2021-02-21

## 2021-02-21 RX ADMIN — LORAZEPAM 2 MG: 2 INJECTION INTRAMUSCULAR at 18:49

## 2021-02-21 RX ADMIN — SODIUM CHLORIDE 1000 ML: 9 INJECTION, SOLUTION INTRAVENOUS at 19:15

## 2021-02-21 RX ADMIN — LORAZEPAM 2 MG: 2 INJECTION, SOLUTION INTRAMUSCULAR; INTRAVENOUS at 18:49

## 2021-02-21 RX ADMIN — IOPAMIDOL 80 ML: 755 INJECTION, SOLUTION INTRAVENOUS at 20:35

## 2021-02-21 RX ADMIN — SODIUM CHLORIDE 1000 ML: 9 INJECTION, SOLUTION INTRAVENOUS at 22:15

## 2021-02-21 NOTE — ED NOTES
Bed: 017A  Expected date: 2/21/21  Expected time: 6:32 PM  Means of arrival: [facility] Dept  Comments:     Ab Jesus RN  02/21/21 0589

## 2021-02-22 PROCEDURE — 93010 ELECTROCARDIOGRAM REPORT: CPT | Performed by: INTERNAL MEDICINE

## 2021-02-22 NOTE — ED PROVIDER NOTES
[facility] [address] [address] EMERGENCY DEPT    EMERGENCY MEDICINE     Pt Name: Afsaneh Valentin  MRN: [id number]  [place] 1970  Date of evaluation: 2/21/2021  Provider: Ronan Miller MD,     CHIEF COMPLAINT       Chief Complaint   Patient presents with    Agitation       HISTORY OF PRESENT ILLNESS    Afsaneh Valentin is a pleasant 52 y.o. male who presents to the emergency department from home with LPD present for altered mental status. History is obtained by the wife. Wife states that patient has a history of diabetes. He has had episodes of low blood sugar before. He has worked multiple night shifts in a row and today he did not wake up at his normal time. She went to go wake him up when she found him laying in bed with his eyes open. She tested his blood sugar and it was 35. She gave him a shot of glucagon at home. He still did not come around and so she gave him a second shot. She states that this is what she has been instructed to do when he has low blood sugar. Patient then became agitated and irritated. She had to call EMS for evaluation. EMS had to call PD because patient was unwilling to come to the emergency department. Patient currently is asking to leave. He keeps repeating the statement \"I just need to get over it \". Triage notes and Nursing notes were reviewed by myself. Any discrepancies are addressed above. PAST MEDICAL HISTORY     Past Medical History:   Diagnosis Date    Hyperlipidemia 2012    Type 1 diabetes mellitus ([facility] [address] 1995    at age 27       SURGICAL HISTORY     No past surgical history on file.     CURRENT MEDICATIONS       Discharge Medication List as of 2/21/2021 10:16 PM      CONTINUE these medications which have NOT CHANGED    Details   LANTUS SOLOSTAR 100 UNIT/ML injection pen Inject 70 Units into the skin every morning (before breakfast), Disp-63 mL, R-3, DAWNormal      metFORMIN (GLUCOPHAGE) 500 MG tablet Take 1 tablet by mouth daily, Disp-90 tablet, R-3Normal sildenafil (VIAGRA) 100 MG tablet Take 1 tablet by mouth as needed for Erectile Dysfunction, Disp-15 tablet, R-2Normal      insulin lispro, 1 Unit Dial, (HUMALOG KWIKPEN) 100 UNIT/ML SOPN INJECT 2 UNITS OF INSULIN FOR EVERY 15 GRAMS OF CARBOHYDRATES 15:1 SLIDING SCALE, Disp-45 pen, R-3Normal      Insulin Pen Needle (PEN NEEDLES 5/16\") 30G X 8 MM MISC Disp-450 each, R-5, NormalUse 5x times per day with insulin      glucose blood VI test strips (YO CONTOUR NEXT TEST) strip Disp-200 strip, R-3, NormalPt testing blood sugar 4 times daily.              ALLERGIES     Atorvastatin and Pravastatin    FAMILY HISTORY       Family History   Problem Relation Age of Onset    Asthma Mother     High Blood Pressure Father 48    Asthma Sister         SOCIAL HISTORY       Social History     Socioeconomic History    Marital status:      Spouse name: Not on file    Number of children: 1    Years of education: 15    Highest education level: Not on file   Occupational History    Occupation: Receiving Department     Employer: [address] [place] [address]   Social Needs    Financial resource strain: Not hard at all    Food insecurity     Worry: Never true     Inability: Never true    Transportation needs     Medical: No     Non-medical: No   Tobacco Use    Smoking status: Current Every Day Smoker     Packs/day: 1.00     Years: 15.00     Pack years: 15.00    Smokeless tobacco: Never Used   Substance and Sexual Activity    Alcohol use: Yes     Comment: social    Drug use: No    Sexual activity: Not on file   Lifestyle    Physical activity     Days per week: Not on file     Minutes per session: Not on file    Stress: Not on file   Relationships    Social connections     Talks on phone: Not on file     Gets together: Not on file     Attends Tenriism service: Not on file     Active member of club or organization: Not on file     Attends meetings of clubs or organizations: Not on file     Relationship status: Not on file  Intimate partner violence     Fear of current or ex partner: Not on file     Emotionally abused: Not on file     Physically abused: Not on file     Forced sexual activity: Not on file   Other Topics Concern    Not on file   Social History Narrative    Not on file       REVIEW OF SYSTEMS     Review of Systems   Unable to perform ROS: Mental status change       Except as noted above the remainder of the review of systems was reviewed and is. PHYSICAL EXAM    (up to 7 for level 4, 8 or more for level 5)     ED Triage Vitals [02/21/21 1901]   BP Temp Temp Source Pulse Resp SpO2 Height Weight   (!) 173/79 97.9 °F (36.6 °C) Oral 111 11 98 % -- --       Physical Exam  Vitals signs and nursing note reviewed. Constitutional:       General: He is not in acute distress. Appearance: He is normal weight. He is ill-appearing. HENT:      Head: Normocephalic and atraumatic. Right Ear: Tympanic membrane, ear canal and external ear normal.      Left Ear: Tympanic membrane, ear canal and external ear normal.      Nose: Nose normal. No congestion. Mouth/Throat:      Mouth: Mucous membranes are moist.      Pharynx: Oropharynx is clear. No oropharyngeal exudate. Eyes:      General: No scleral icterus. Right eye: No discharge. Left eye: No discharge. Extraocular Movements: Extraocular movements intact. Conjunctiva/sclera: Conjunctivae normal.      Pupils: Pupils are equal, round, and reactive to light. Neck:      Musculoskeletal: Neck supple. Cardiovascular:      Rate and Rhythm: Regular rhythm. Tachycardia present. Pulses: Normal pulses. Heart sounds: Normal heart sounds. No murmur. No friction rub. Pulmonary:      Effort: Pulmonary effort is normal. No respiratory distress. Breath sounds: Normal breath sounds. No decreased breath sounds or wheezing. Abdominal:      General: Bowel sounds are normal. There is distension. Palpations: Abdomen is soft. Tenderness: There is no abdominal tenderness. There is no guarding or rebound. Musculoskeletal: Normal range of motion. Right lower leg: No edema. Left lower leg: No edema. Skin:     General: Skin is warm and dry. Capillary Refill: Capillary refill takes less than 2 seconds. Coloration: Skin is not jaundiced or pale. Neurological:      Mental Status: He is alert. He is confused. GCS: GCS eye subscore is 4. GCS verbal subscore is 4. GCS motor subscore is 6. Cranial Nerves: No facial asymmetry. Sensory: Sensation is intact. Motor: Motor function is intact. Psychiatric:         Attention and Perception: Attention normal.         Mood and Affect: Mood is anxious. Speech: Speech is tangential.         Behavior: Behavior is agitated and aggressive. DIAGNOSTIC RESULTS     EKG:(none if blank)  All EKG's are interpreted [facility] who either signs or Co-signs this chart in the absence of a cardiologist.        RADIOLOGY: (none if blank)   Interpretation per the Radiologistbelow, if available at the time of this note:    CT ABDOMEN PELVIS W IV CONTRAST Additional Contrast? None   Final Result   Impression:      Nonspecific bilateral perinephric stranding, question infection or    inflammation. This document has been electronically signed by: Vale Wu MD on    02/21/2021 09:15 PM      All CTs at this facility use dose modulation techniques and iterative    reconstructions, and/or weight-based dosing   when appropriate to reduce radiation to a low as reasonably achievable. CT Head WO Contrast   Final Result   Unremarkable CT brain            **This report has been created using voice recognition software. It may contain minor errors which are inherent in voice recognition technology. **      Final report electronically signed by Dr. Morgan Flores on 2/21/2021 7:20 PM          LABS:  Labs Reviewed   CBC WITH AUTO dehydration. Will evaluate with CBC, CMP, troponin, CT head, lipase, UA, urine drug screen, alcohol level. Will also add a CT abdomen pelvis given that he had some distention of his abdomen though there was no tenderness. We will give IV hydration here. Patient did require 2 mg of IM Ativan in order for him to calm down and allow us to safely get him to cooperate. We did not have to use any restraints after the Ativan was used. Wife is also able to calm him down verbally. /  ED Course as of Feb 21 2354   Sun Feb 21, 2021 2212 Patient is requesting to leave and states that he needs to go. He is now alert and oriented. I explained that his heart rate was still elevated and he had evidence of elevated white blood cell count and lactate. I had wanted to repeat the lactate after hydration however patient is demanding to leave with his wife. Patient was described to the [employer] process and was able to make his own decisions. [DD]      ED Course User Index  [DD] George Kruse MD         The patient was evaluated during the global COVID-19 pandemic, and that diagnosis was considered upon their initial presentation. Their evaluation, treatment and testing was consistent with current guidelines for patients who present with complaints or symptoms that may be related to COVID-19.     Strict returnprecautions and follow up instructions were discussed with the patient with which the patient agrees        ED Medications administered this visit:    Medications   LORazepam (ATIVAN) injection 2 mg (2 mg Intramuscular Given 2/21/21 1849)   0.9 % sodium chloride bolus (0 mLs Intravenous Stopped 2/21/21 2029)   iopamidol (ISOVUE-370) 76 % injection 80 mL (80 mLs Intravenous Given 2/21/21 2035)   0.9 % sodium chloride bolus (0 mLs Intravenous Stopped 2/21/21 2226)         Critical Care  Performed by: George Kruse MD  Authorized by: George Kruse MD     Critical care provider statement:     Critical care time (minutes):  45    Critical care was necessary to treat or prevent imminent or life-threatening deterioration of the following conditions: Psych. Critical care was time spent personally by me on the following activities:  Development of treatment plan with patient or surrogate, evaluation of patient's response to treatment, examination of patient, ordering and performing treatments and interventions, ordering and review of laboratory studies, ordering and review of radiographic studies, pulse oximetry, re-evaluation of patient's condition and review of old charts    : (None if blank)       CLINICAL       1.  Hypoglycemia          DISPOSITION/PLAN   DISPOSITION [place] 02/21/2021 10:14:41 PM      PATIENT REFERRED TO:  Jake Maradiaga, [address] Gem Costello  [phone]    Schedule an appointment as soon as possible for a visit   If symptoms worsen      DISCHARGE MEDICATIONS:  Discharge Medication List as of 2/21/2021 10:16 PM                 (Please note that portions of this note were completed with a voice recognition program.  Efforts were made to edit the dictations but occasionallywords are mis-transcribed.)      Electronically signed by Kasia Silva MD on 2/21/21 at 11:49 PM EST    Attending Physician, Emergency Department       Alhaji Gates MD  02/21/21 3605

## 2021-02-22 NOTE — ED NOTES
While having CT of the abdomen taken at the lab, patient vomited. He was cleaned and returned back to his room safely after the procedure. Urine sample taken and send to the lab at this time. Vitals updated. Warm blanket offered to Patient for comfort. No further assistance requested. Family member at the bed side. Call light and table within reach. Will continue to monitor Patient.       Aaron Quivers  02/21/21 3400

## 2021-02-22 NOTE — ED NOTES
Patient lying in bed with eyes closed at this time. Patient respirations are regular and unlabored. Patient appears to be in no current distress. Patient VSS. Call light is within reach. Patient expresses no needs at this time. Family and sitter at the bedside.        Bairon Zamudio RN  02/21/21 2007

## 2021-10-07 ENCOUNTER — HOSPITAL ENCOUNTER (EMERGENCY)
Age: 51
Discharge: HOME | End: 2021-10-07
Attending: EMERGENCY MEDICINE
Payer: COMMERCIAL

## 2021-10-07 ENCOUNTER — APPOINTMENT (OUTPATIENT)
Dept: CT IMAGING | Age: 51
End: 2021-10-07
Payer: COMMERCIAL

## 2021-10-07 VITALS
RESPIRATION RATE: 17 BRPM | DIASTOLIC BLOOD PRESSURE: 65 MMHG | OXYGEN SATURATION: 97 % | SYSTOLIC BLOOD PRESSURE: 123 MMHG | HEART RATE: 82 BPM | TEMPERATURE: 96.9 F | WEIGHT: 204 LBS | BODY MASS INDEX: 32.02 KG/M2 | HEIGHT: 67 IN

## 2021-10-07 DIAGNOSIS — E10.8 TYPE 1 DIABETES MELLITUS WITH COMPLICATION (HCC): ICD-10-CM

## 2021-10-07 DIAGNOSIS — R40.4 TRANSIENT ALTERATION OF AWARENESS: Primary | ICD-10-CM

## 2021-10-07 LAB
ALBUMIN SERPL-MCNC: 4.6 G/DL (ref 3.5–5.1)
ALP BLD-CCNC: 120 U/L (ref 38–126)
ALT SERPL-CCNC: 30 U/L (ref 11–66)
AMMONIA: 36 UMOL/L (ref 11–60)
ANION GAP SERPL CALCULATED.3IONS-SCNC: 12 MEQ/L (ref 8–16)
AST SERPL-CCNC: 24 U/L (ref 5–40)
BASE EXCESS MIXED: -1.9 MMOL/L (ref -2–3)
BASOPHILS # BLD: 0.2 %
BASOPHILS ABSOLUTE: 0 THOU/MM3 (ref 0–0.1)
BILIRUB SERPL-MCNC: 0.5 MG/DL (ref 0.3–1.2)
BUN BLDV-MCNC: 13 MG/DL (ref 7–22)
CALCIUM SERPL-MCNC: 9.4 MG/DL (ref 8.5–10.5)
CHLORIDE BLD-SCNC: 101 MEQ/L (ref 98–111)
CO2: 22 MEQ/L (ref 23–33)
COLLECTED BY:: ABNORMAL
CREAT SERPL-MCNC: 0.6 MG/DL (ref 0.4–1.2)
EOSINOPHIL # BLD: 0.1 %
EOSINOPHILS ABSOLUTE: 0 THOU/MM3 (ref 0–0.4)
ERYTHROCYTE [DISTWIDTH] IN BLOOD BY AUTOMATED COUNT: 13 % (ref 11.5–14.5)
ERYTHROCYTE [DISTWIDTH] IN BLOOD BY AUTOMATED COUNT: 45.9 FL (ref 35–45)
ETHYL ALCOHOL, SERUM: < 0.01 %
GFR SERPL CREATININE-BSD FRML MDRD: > 90 ML/MIN/1.73M2
GLUCOSE BLD-MCNC: 112 MG/DL (ref 70–108)
GLUCOSE BLD-MCNC: 135 MG/DL (ref 70–108)
HCO3, MIXED: 21 MMOL/L (ref 23–28)
HCT VFR BLD CALC: 46 % (ref 42–52)
HEMOGLOBIN: 15.7 GM/DL (ref 14–18)
IMMATURE GRANS (ABS): 0.08 THOU/MM3 (ref 0–0.07)
IMMATURE GRANULOCYTES: 0.5 %
LACTIC ACID: 0.8 MMOL/L (ref 0.5–2)
LIPASE: 14.2 U/L (ref 5.6–51.3)
LYMPHOCYTES # BLD: 5.3 %
LYMPHOCYTES ABSOLUTE: 0.8 THOU/MM3 (ref 1–4.8)
MCH RBC QN AUTO: 32.6 PG (ref 26–33)
MCHC RBC AUTO-ENTMCNC: 34.1 GM/DL (ref 32.2–35.5)
MCV RBC AUTO: 95.4 FL (ref 80–94)
MONOCYTES # BLD: 3.8 %
MONOCYTES ABSOLUTE: 0.6 THOU/MM3 (ref 0.4–1.3)
NUCLEATED RED BLOOD CELLS: 0 /100 WBC
O2 SAT, MIXED: 49 %
OSMOLALITY CALCULATION: 271 MOSMOL/KG (ref 275–300)
PCO2, MIXED VENOUS: 31 MMHG (ref 41–51)
PH, MIXED: 7.45 (ref 7.31–7.41)
PLATELET # BLD: 327 THOU/MM3 (ref 130–400)
PMV BLD AUTO: 10.8 FL (ref 9.4–12.4)
PO2 MIXED: 25 MMHG (ref 25–40)
POTASSIUM REFLEX MAGNESIUM: 4.6 MEQ/L (ref 3.5–5.2)
PRO-BNP: 21.7 PG/ML (ref 0–450)
RBC # BLD: 4.82 MILL/MM3 (ref 4.7–6.1)
SEG NEUTROPHILS: 90.1 %
SEGMENTED NEUTROPHILS ABSOLUTE COUNT: 14.2 THOU/MM3 (ref 1.8–7.7)
SODIUM BLD-SCNC: 135 MEQ/L (ref 135–145)
TOTAL PROTEIN: 7.3 G/DL (ref 6.1–8)
TROPONIN T: < 0.01 NG/ML
TSH SERPL DL<=0.05 MIU/L-ACNC: 0.46 UIU/ML (ref 0.4–4.2)
WBC # BLD: 15.8 THOU/MM3 (ref 4.8–10.8)

## 2021-10-07 PROCEDURE — 83690 ASSAY OF LIPASE: CPT

## 2021-10-07 PROCEDURE — 84484 ASSAY OF TROPONIN QUANT: CPT

## 2021-10-07 PROCEDURE — 83880 ASSAY OF NATRIURETIC PEPTIDE: CPT

## 2021-10-07 PROCEDURE — 82948 REAGENT STRIP/BLOOD GLUCOSE: CPT

## 2021-10-07 PROCEDURE — 82803 BLOOD GASES ANY COMBINATION: CPT

## 2021-10-07 PROCEDURE — 82077 ASSAY SPEC XCP UR&BREATH IA: CPT

## 2021-10-07 PROCEDURE — 2580000003 HC RX 258: Performed by: STUDENT IN AN ORGANIZED HEALTH CARE EDUCATION/TRAINING PROGRAM

## 2021-10-07 PROCEDURE — 83605 ASSAY OF LACTIC ACID: CPT

## 2021-10-07 PROCEDURE — 80053 COMPREHEN METABOLIC PANEL: CPT

## 2021-10-07 PROCEDURE — 82140 ASSAY OF AMMONIA: CPT

## 2021-10-07 PROCEDURE — 99283 EMERGENCY DEPT VISIT LOW MDM: CPT

## 2021-10-07 PROCEDURE — 36415 COLL VENOUS BLD VENIPUNCTURE: CPT

## 2021-10-07 PROCEDURE — 84443 ASSAY THYROID STIM HORMONE: CPT

## 2021-10-07 PROCEDURE — 93005 ELECTROCARDIOGRAM TRACING: CPT | Performed by: STUDENT IN AN ORGANIZED HEALTH CARE EDUCATION/TRAINING PROGRAM

## 2021-10-07 PROCEDURE — 85025 COMPLETE CBC W/AUTO DIFF WBC: CPT

## 2021-10-07 RX ORDER — IBUPROFEN 600 MG/1
1 TABLET ORAL PRN
Qty: 1 KIT | Refills: 1 | Status: SHIPPED | OUTPATIENT
Start: 2021-10-07 | End: 2022-03-08 | Stop reason: SDUPTHER

## 2021-10-07 RX ORDER — 0.9 % SODIUM CHLORIDE 0.9 %
1000 INTRAVENOUS SOLUTION INTRAVENOUS ONCE
Status: COMPLETED | OUTPATIENT
Start: 2021-10-07 | End: 2021-10-07

## 2021-10-07 RX ORDER — NICOTINE POLACRILEX 4 MG
15 LOZENGE BUCCAL SEE ADMIN INSTRUCTIONS
Qty: 90 G | Refills: 1 | Status: SHIPPED | OUTPATIENT
Start: 2021-10-07

## 2021-10-07 RX ADMIN — SODIUM CHLORIDE 1000 ML: 9 INJECTION, SOLUTION INTRAVENOUS at 19:20

## 2021-10-07 ASSESSMENT — ENCOUNTER SYMPTOMS
NAUSEA: 0
WHEEZING: 0
DIARRHEA: 0
COUGH: 0
ABDOMINAL DISTENTION: 0
VOMITING: 0
STRIDOR: 0
SHORTNESS OF BREATH: 0
CHEST TIGHTNESS: 0
CHOKING: 0

## 2021-10-07 NOTE — ED NOTES
Bed: 020A  Expected date:   Expected time:   Means of arrival:   Comments:     David Corley RN  10/07/21 9748

## 2021-10-07 NOTE — Clinical Note
John Elizondotti was seen and treated in our emergency department on 10/7/2021. He may return to work on 10/08/2021. If you have any questions or concerns, please don't hesitate to call.       Micaela Harris MD

## 2021-10-07 NOTE — ED NOTES
Pt to ED c/o low blood sugar. Pt girlfriend says that he did not wake up at his normal time for work this evening and when she went to check on him she could tell his sugar was low. Pt girlfriend gave him 4 packets of oral glucose, tried giving him peanut butter, jelly and orange juice. Upon EMS arrival pt glucose in the 400's. On pt arrival to ED glucose 135. Pt arousable to voice. Pt recognizes girlfriend and able to talk to her. Pt rolled over at this time and closed his eyes. Pt respirations unlabored. Monitor applied.       Jeronimo ROBERTSON RN  10/07/21 1900

## 2021-10-07 NOTE — ED PROVIDER NOTES
[facility]  EMERGENCY MEDICINE ATTENDING ATTESTATION      Evaluation of Inge Delvalle. Case discussed and care plan developed with resident physician. I agree with the resident physician documentation and plan as documented by her, except if my documentation differs. Patient seen, interviewed and examined by me. I reviewed the medical, surgical, family and social history, medications and allergies. I have reviewed the nursing documentation. I have reviewed the patient's vital signs and are abnormal from Tachycardia when stimulated, hypertension per my interpretation. Body mass index is 31.95 kg/m². Pulsoxymetry is normal per my interpretation. Brief H&P   Patient brought in by wife for somnolence and altered mental status with possible hypoglycemia. Patient has history of diabetes and wife states that he has been working overtime after his 12-hour shift for the last 3 weeks. She is concerned that he may just be very tired. She states that today she been checking his glucose but since he seemed different gave him peanut butter and later on check the glucose found to be 84. Patient is not very forthcoming but does not offer any complaints. Physical exam is notable for well appearing, somnolent, easily arousable. Otherwise nonfocal exam.      Medical Decision Making   MDM:   1. Ultimately status  2. Possible hypoglycemia  3. Rule out infection. Plan:   Omar Finical line, labs   IV fluids   Imaging   Observation   EKG    Please see the resident physician completed note for final disposition except as documented on this attestation. I have reviewed and interpreted all available lab, radiology and ekg results available at the moment. Diagnosis, treatment and disposition plans were discussed and agreed upon by patient. This transcription was electronically signed. It was dictated by use of voice recognition software and electronically transcribed.  The transcription may contain errors not detected in proofreading.      I performed direct supervision and was present for the critical portion following procedures: None  Critical care time on this case: None    Electronically signed by Prasanth Villa MD on 10/7/21 at 7:32 PM EDT       Prasanth Villa MD  10/07/21 8034

## 2021-10-08 LAB
EKG ATRIAL RATE: 85 BPM
EKG P AXIS: 31 DEGREES
EKG P-R INTERVAL: 142 MS
EKG Q-T INTERVAL: 340 MS
EKG QRS DURATION: 94 MS
EKG QTC CALCULATION (BAZETT): 404 MS
EKG R AXIS: 77 DEGREES
EKG T AXIS: 44 DEGREES
EKG VENTRICULAR RATE: 85 BPM

## 2021-10-08 NOTE — ED PROVIDER NOTES
[facility]          Pt Name: Genie Gan  MRN: [id number]  [place] 1970  Date of evaluation: 10/7/2021  Treating Resident Physician: Donald Cartwright MD  Supervising Physician: Dary Cuevas       Chief Complaint   Patient presents with    Hypoglycemia     History obtained from spouse and the patient. HISTORY OF PRESENT ILLNESS    HPI  Genie Gan is a 54 y.o. male who presents to the emergency department for evaluation of altered mental status. According to patient's girlfriend, patient has been working a lot, overslept, then was not as alert as he normally is. Girlfriend gave 1 tube of oral glucose, then check her sugar, which was in the 80s. She gave him 2 more tubes of glucose, called squad. On presentation, patient easily arousable, moving all extremities, walking without assistance. The patient has no other acute complaints at this time. REVIEW OF SYSTEMS   Review of Systems   Constitutional: Negative for diaphoresis and fatigue. HENT: Negative. Respiratory: Negative for cough, choking, chest tightness, shortness of breath, wheezing and stridor. Cardiovascular: Negative for chest pain, palpitations and leg swelling. Gastrointestinal: Negative for abdominal distention, diarrhea, nausea and vomiting. Genitourinary: Negative for dysuria, frequency and urgency. Musculoskeletal: Negative for arthralgias and myalgias. Neurological: Negative for dizziness, tremors, syncope, weakness, numbness and headaches. Psychiatric/Behavioral: Positive for confusion and decreased concentration. PAST MEDICAL AND SURGICAL HISTORY     Past Medical History:   Diagnosis Date    Hyperlipidemia 2012    Type 1 diabetes mellitus ([facility] [address] 1995    at age 27     History reviewed. No pertinent surgical history. MEDICATIONS   No current facility-administered medications for this encounter.     Current Outpatient Medications:     glucose (GLUTOSE 45) 40 % GEL, Take 37.5 mLs by mouth See Admin Instructions, Disp: 90 g, Rfl: 1    Glucagon, rDNA, (GLUCAGON EMERGENCY) 1 MG KIT, Inject 1 ampule as directed as needed (hypoglycemia), Disp: 1 kit, Rfl: 1    LANTUS SOLOSTAR 100 UNIT/ML injection pen, Inject 70 Units into the skin every morning (before breakfast), Disp: 63 mL, Rfl: 3    metFORMIN (GLUCOPHAGE) 500 MG tablet, Take 1 tablet by mouth daily, Disp: 90 tablet, Rfl: 3    sildenafil (VIAGRA) 100 MG tablet, Take 1 tablet by mouth as needed for Erectile Dysfunction, Disp: 15 tablet, Rfl: 2    insulin lispro, 1 Unit Dial, (HUMALOG KWIKPEN) 100 UNIT/ML SOPN, INJECT 2 UNITS OF INSULIN FOR EVERY 15 GRAMS OF CARBOHYDRATES 15:1 SLIDING SCALE, Disp: 45 pen, Rfl: 3    Insulin Pen Needle (PEN NEEDLES 5/16\") 30G X 8 MM MISC, Use 5x times per day with insulin, Disp: 450 each, Rfl: 5    glucose blood VI test strips (YO CONTOUR NEXT TEST) strip, Pt testing blood sugar 4 times daily. , Disp: 200 strip, Rfl: 3      SOCIAL HISTORY     Social History     Social History Narrative    Not on file     Social History     Tobacco Use    Smoking status: Current Every Day Smoker     Packs/day: 1.00     Years: 15.00     Pack years: 15.00    Smokeless tobacco: Never Used   Substance Use Topics    Alcohol use: Yes     Comment: social    Drug use: No         ALLERGIES     Allergies   Allergen Reactions    Atorvastatin Other (See Comments)     Myalgias    Pravastatin Other (See Comments)     Myalgias         FAMILY HISTORY     Family History   Problem Relation Age of Onset    Asthma Mother     High Blood Pressure Father 48    Asthma Sister          PREVIOUS RECORDS   Previous records reviewed: Medical, past surgical, medications, allergies        PHYSICAL EXAM     ED Triage Vitals [10/07/21 1847]   BP Temp Temp Source Pulse Resp SpO2 Height Weight   (!) 158/80 96.9 °F (36.1 °C) Oral 94 17 99 % 5' 7\" (1.702 m) 204 lb (92.5 kg) Initial vital signs and nursing assessment reviewed and normal. Body mass index is 31.95 kg/m². Pulsoximetry is normal per my interpretation. Additional Vital Signs:  Vitals:    10/07/21 2056   BP: 123/65   Pulse: 82   Resp: 17   Temp:    SpO2: 97%       Physical Exam  Constitutional:       Appearance: Normal appearance. HENT:      Head: Normocephalic and atraumatic. Right Ear: External ear normal.      Left Ear: External ear normal.      Nose: Nose normal.      Mouth/Throat:      Mouth: Mucous membranes are moist.      Pharynx: Oropharynx is clear. Eyes:      Conjunctiva/sclera: Conjunctivae normal.      Pupils: Pupils are equal, round, and reactive to light. Cardiovascular:      Rate and Rhythm: Normal rate and regular rhythm. Heart sounds: Normal heart sounds. Pulmonary:      Effort: Pulmonary effort is normal. No respiratory distress. Breath sounds: Normal breath sounds. No wheezing. Abdominal:      General: Abdomen is flat. Bowel sounds are normal. There is no distension. Palpations: Abdomen is soft. Tenderness: There is no abdominal tenderness. Musculoskeletal:         General: Normal range of motion. Cervical back: Normal range of motion and neck supple. Skin:     General: Skin is warm and dry. Capillary Refill: Capillary refill takes less than 2 seconds. Neurological:      General: No focal deficit present. Mental Status: He is alert and oriented to person, place, and time. Cranial Nerves: No cranial nerve deficit. Sensory: No sensory deficit. Motor: No weakness. Coordination: Coordination normal.      Gait: Gait normal.              MEDICAL DECISION MAKING   Initial Assessment:   3 82-year-old male, history of type 1 diabetes, hypoglycemia, presenting with altered mental status. Physical exam significant for drowsy appearing but arousable male in no acute distress.   Glucose on arrival within normal limits, but had been given glucose by girlfriend prior to EMS arrival.  Plan:    CBC, CMP, CT head, ammonia, VBG, lactate, ethanol, UDS.  Patient returned to baseline, denied CT   Discharged home with strict return precaution, follow-up, glucose solution, glucagon prescription. ED RESULTS   Laboratory results:  Labs Reviewed   CBC WITH AUTO DIFFERENTIAL - Abnormal; Notable for the following components:       Result Value    WBC 15.8 (*)     MCV 95.4 (*)     RDW-SD 45.9 (*)     Segs Absolute 14.2 (*)     Lymphocytes Absolute 0.8 (*)     Immature Grans (Abs) 0.08 (*)     All other components within normal limits   COMPREHENSIVE METABOLIC PANEL W/ REFLEX TO MG FOR LOW K - Abnormal; Notable for the following components:    Glucose 112 (*)     CO2 22 (*)     All other components within normal limits   BLOOD GAS, VENOUS - Abnormal; Notable for the following components:    PH MIXED 7.45 (*)     PCO2, MIXED VENOUS 31 (*)     HCO3, Mixed 21 (*)     All other components within normal limits   OSMOLALITY - Abnormal; Notable for the following components:    Osmolality Calc 271.0 (*)     All other components within normal limits   POCT GLUCOSE - Abnormal; Notable for the following components:    POC Glucose 135 (*)     All other components within normal limits   LIPASE   TROPONIN   BRAIN NATRIURETIC PEPTIDE   ETHANOL   TSH WITH REFLEX   GLOMERULAR FILTRATION RATE, ESTIMATED   LACTIC ACID, PLASMA   URINE RT REFLEX TO CULTURE   URINE DRUG SCREEN   AMMONIA       Radiologic studies results:  CT HEAD WO CONTRAST    (Results Pending)       ED Medications administered this visit:   Medications   0.9 % sodium chloride bolus (1,000 mLs IntraVENous New Bag 10/7/21 1920)         ED COURSE         Strict return precautions and follow up instructions were discussed with the patient prior to discharge, with which the patient agrees.       MEDICATION CHANGES     New Prescriptions    GLUCAGON, RDNA, (GLUCAGON EMERGENCY) 1 MG KIT    Inject 1 ampule as directed as needed (hypoglycemia)    GLUCOSE (GLUTOSE 45) 40 % GEL    Take 37.5 mLs by mouth See Admin Instructions         FINAL DISPOSITION     Final diagnoses:   Transient alteration of awareness     Condition: condition: good  Dispo: Discharge to home      This transcription was electronically signed. Parts of this transcriptions may have been dictated by use of voice recognition software and electronically transcribed, and parts may have been transcribed with the assistance of an ED scribe. The transcription may contain errors not detected in proofreading. Please refer to my supervising physician's documentation if my documentation differs.     Electronically Signed: Socorro Locke MD, 10/07/21, 9:28 PM          Socorro Lcoke MD  Resident  10/07/21 2061

## 2021-10-08 NOTE — ED NOTES
CT attempted. Pt uncooperative. Provider notified.      [facility] SUZETTE, RN  10/07/21 2000

## 2021-11-22 DIAGNOSIS — E10.69 TYPE 1 DIABETES MELLITUS WITH OTHER SPECIFIED COMPLICATION (HCC): ICD-10-CM

## 2021-11-22 RX ORDER — INSULIN GLARGINE 100 [IU]/ML
INJECTION, SOLUTION SUBCUTANEOUS
Qty: 60 ML | Refills: 3 | Status: SHIPPED | OUTPATIENT
Start: 2021-11-22 | End: 2022-03-08 | Stop reason: SDUPTHER

## 2021-12-04 DIAGNOSIS — E10.69 TYPE 1 DIABETES MELLITUS WITH OTHER SPECIFIED COMPLICATION (HCC): ICD-10-CM

## 2022-03-08 ENCOUNTER — OFFICE VISIT (OUTPATIENT)
Dept: FAMILY MEDICINE CLINIC | Age: 52
End: 2022-03-08
Payer: COMMERCIAL

## 2022-03-08 VITALS
DIASTOLIC BLOOD PRESSURE: 78 MMHG | HEIGHT: 67 IN | HEART RATE: 86 BPM | BODY MASS INDEX: 32.99 KG/M2 | WEIGHT: 210.2 LBS | SYSTOLIC BLOOD PRESSURE: 136 MMHG | RESPIRATION RATE: 18 BRPM | OXYGEN SATURATION: 99 % | TEMPERATURE: 97 F

## 2022-03-08 DIAGNOSIS — Z00.00 ANNUAL PHYSICAL EXAM: Primary | ICD-10-CM

## 2022-03-08 DIAGNOSIS — E10.69 TYPE 1 DIABETES MELLITUS WITH OTHER SPECIFIED COMPLICATION (HCC): ICD-10-CM

## 2022-03-08 DIAGNOSIS — N52.9 VASCULOGENIC ERECTILE DYSFUNCTION, UNSPECIFIED VASCULOGENIC ERECTILE DYSFUNCTION TYPE: ICD-10-CM

## 2022-03-08 LAB — HBA1C MFR BLD: 9 % (ref 4.3–5.7)

## 2022-03-08 PROCEDURE — 99396 PREV VISIT EST AGE 40-64: CPT | Performed by: FAMILY MEDICINE

## 2022-03-08 PROCEDURE — G8484 FLU IMMUNIZE NO ADMIN: HCPCS | Performed by: FAMILY MEDICINE

## 2022-03-08 RX ORDER — PEN NEEDLE, DIABETIC 30 GX5/16"
NEEDLE, DISPOSABLE MISCELLANEOUS
Qty: 450 EACH | Refills: 5 | Status: SHIPPED | OUTPATIENT
Start: 2022-03-08

## 2022-03-08 RX ORDER — IBUPROFEN 600 MG/1
1 TABLET ORAL PRN
Qty: 1 KIT | Refills: 1 | Status: SHIPPED | OUTPATIENT
Start: 2022-03-08

## 2022-03-08 RX ORDER — INSULIN LISPRO 100 [IU]/ML
INJECTION, SOLUTION INTRAVENOUS; SUBCUTANEOUS
Qty: 45 PEN | Refills: 3 | Status: SHIPPED | OUTPATIENT
Start: 2022-03-08

## 2022-03-08 RX ORDER — SILDENAFIL 100 MG/1
100 TABLET, FILM COATED ORAL PRN
Qty: 15 TABLET | Refills: 2 | Status: SHIPPED | OUTPATIENT
Start: 2022-03-08

## 2022-03-08 RX ORDER — BLOOD-GLUCOSE SENSOR
EACH MISCELLANEOUS
Qty: 10 EACH | Refills: 0 | Status: SHIPPED | OUTPATIENT
Start: 2022-03-08

## 2022-03-08 RX ORDER — INSULIN GLARGINE 100 [IU]/ML
INJECTION, SOLUTION SUBCUTANEOUS
Qty: 60 ML | Refills: 3 | Status: SHIPPED | OUTPATIENT
Start: 2022-03-08

## 2022-03-08 RX ORDER — BLOOD-GLUCOSE TRANSMITTER
EACH MISCELLANEOUS
Qty: 5 EACH | Refills: 0 | Status: SHIPPED | OUTPATIENT
Start: 2022-03-08

## 2022-03-08 ASSESSMENT — PATIENT HEALTH QUESTIONNAIRE - PHQ9
SUM OF ALL RESPONSES TO PHQ QUESTIONS 1-9: 0
2. FEELING DOWN, DEPRESSED OR HOPELESS: 0
1. LITTLE INTEREST OR PLEASURE IN DOING THINGS: 0
SUM OF ALL RESPONSES TO PHQ9 QUESTIONS 1 & 2: 0
SUM OF ALL RESPONSES TO PHQ QUESTIONS 1-9: 0

## 2022-03-08 NOTE — PROGRESS NOTES
Chief Complaint   Patient presents with    Follow-up         SUBJECTIVE:  Gregorio Solis is a 54 y.o. male for physical exam.    Diet - 'Not really good, I like to eat', does note that he is eating a lot of carbs  Exercise - Stays active at work    Diabetes Type 1    Glucose control:   Does patient check blood glucoses at home? Yes - states that it continues to be variable, notes that it tends to run better when he is taking the metformin. Feels like he would do better with a continuous  Report of hypoglycemia: yes  Lab Results   Component Value Date    LABA1C 9.0 (H) 03/08/2022     No results found for: EAG    Symptoms  Polyuria, Polydipsia or Polyphagia? No  Chest Pain, SOB, or Palpitations? -  No  New Vision complaints? No  Paresthesias of the extremities? No    Medications  Current medication were reviewed. Compliant with medications? yes  Medication side effects? No  On ACE-I or ARB? No  On antiplatelet therapy? Yes  On Statin? No        Wt Readings from Last 3 Encounters:   03/08/22 210 lb 3.2 oz (95.3 kg)   10/07/21 204 lb (92.5 kg)   12/30/20 204 lb (92.5 kg)       Blood pressure control:  BP Readings from Last 3 Encounters:   03/08/22 136/78   10/07/21 123/65   02/21/21 (!) 148/89       Lab Results   Component Value Date    LABMICR 0.70 07/29/2013       Lab Results   Component Value Date    LDLCALC 165 06/09/2018           Health Maintenance reviewed with patient today. Past Medical History:   Diagnosis Date    Hyperlipidemia 2012    Type 1 diabetes mellitus ([facility] [address] 1995    at age 27       History reviewed. No pertinent surgical history.     Social History     Socioeconomic History    Marital status:      Spouse name: Not on file    Number of children: 1    Years of education: 12    Highest education level: Not on file   Occupational History    Occupation: Receiving Department     Employer: [employer]   Tobacco Use    Smoking status: Current Every Day Smoker Packs/day: 1.00     Years: 15.00     Pack years: 15.00    Smokeless tobacco: Never Used   Substance and Sexual Activity    Alcohol use: Yes     Comment: social    Drug use: No    Sexual activity: Not on file   Other Topics Concern    Not on file   Social History Narrative    Not on file     Social Determinants of Health     Financial Resource Strain:     Difficulty of Paying Living Expenses: Not on file   Food Insecurity:     Worried About Running Out of Food in the Last Year: Not on file    Steve of Food in the Last Year: Not on file   Transportation Needs:     Lack of Transportation (Medical): Not on file    Lack of Transportation (Non-Medical): Not on file   Physical Activity:     Days of Exercise per Week: Not on file    Minutes of Exercise per Session: Not on file   Stress:     Feeling of Stress : Not on file   Social Connections:     Frequency of Communication with Friends and Family: Not on file    Frequency of Social Gatherings with Friends and Family: Not on file    Attends Hinduism Services: Not on file    Active Member of [address] [place] [address] or Organizations: Not on file    Attends Club or Organization Meetings: Not on file    Marital Status: Not on file   Intimate Partner Violence:     Fear of Current or Ex-Partner: Not on file    Emotionally Abused: Not on file    Physically Abused: Not on file    Sexually Abused: Not on file   Housing Stability:     Unable to Pay for Housing in the Last Year: Not on file    Number of [place] in the Last Year: Not on file    Unstable Housing in the Last Year: Not on file       Family History   Problem Relation Age of Onset    Asthma Mother     High Blood Pressure Father 48    Asthma Sister            I have reviewed the patient's past medical history, past surgical history, allergies, medications, social and family history and I have made updates where appropriate.           PHYSICAL EXAM:  /78   Pulse 86   Temp 97 °F (36.1 °C) (Infrared) Resp 18   Ht 5' 7\" (1.702 m)   Wt 210 lb 3.2 oz (95.3 kg)   SpO2 99%   BMI 32.92 kg/m²       Physical Exam  Nursing note reviewed. Constitutional:       Appearance: He is well-developed. Pulmonary:      Effort: Pulmonary effort is normal. No respiratory distress. Neurological:      Mental Status: He is alert. Psychiatric:         Behavior: Behavior normal.         Thought Content: Thought content normal.         Judgment: Judgment normal.             ASSESSMENT & PLAN  Diana Walters was seen today for follow-up. Diagnoses and all orders for this visit:    Annual physical exam    Type 1 diabetes mellitus with other specified complication ([email or web address]  -     POCT glycosylated hemoglobin (Hb A1C)  -     Continuous Blood Gluc Sensor (DEXCOM G6 SENSOR) MISC; Apply q 10 days  -     Continuous Blood Gluc Transmit (DEXCOM G6 TRANSMITTER) MISC; Change q 3 months  -     Glucagon, rDNA, (GLUCAGON EMERGENCY) 1 MG KIT; Inject 1 ampule as directed as needed (hypoglycemia)  -     metFORMIN (GLUCOPHAGE) 500 MG tablet; TAKE 1 TABLET DAILY  -     LANTUS SOLOSTAR 100 UNIT/ML injection pen; INJECT 70 UNITS UNDER THE SKIN EVERY MORNING BEFORE BREAKFAST  -     insulin lispro, 1 Unit Dial, (HUMALOG KWIKPEN) 100 UNIT/ML SOPN; INJECT 2 UNITS OF INSULIN FOR EVERY 15 GRAMS OF CARBOHYDRATES 15:1 SLIDING SCALE  -     Insulin Pen Needle (PEN NEEDLES 5/16\") 30G X 8 MM MISC; Use 5x times per day with insulin    Vasculogenic erectile dysfunction, unspecified vasculogenic erectile dysfunction type  -     sildenafil (VIAGRA) 100 MG tablet; Take 1 tablet by mouth as needed for Erectile Dysfunction    -DM uncontrolled, feels like he would do better with closer monitoring. Advised on diet and regularly taking the metformin. Recheck in 3 months. Return in about 3 months (around 6/8/2022). Counseling was provided today regarding the following topics: Healthy eating habits, Regular exercise, substance abuse and healthy sleep habits.

## 2022-05-16 ENCOUNTER — HOSPITAL ENCOUNTER (OUTPATIENT)
Age: 52
End: 2022-05-16

## 2022-05-16 DIAGNOSIS — E10.65 TYPE 1 DIABETES MELLITUS WITH HYPERGLYCEMIA (HCC): ICD-10-CM

## 2022-05-17 ENCOUNTER — OFFICE VISIT (OUTPATIENT)
Dept: INTERNAL MEDICINE CLINIC | Age: 52
End: 2022-05-17
Payer: COMMERCIAL

## 2022-05-17 ENCOUNTER — HOSPITAL ENCOUNTER (OUTPATIENT)
Age: 52
Discharge: HOME | End: 2022-05-17
Payer: COMMERCIAL

## 2022-05-17 VITALS — TEMPERATURE: 97.6 F | BODY MASS INDEX: 32.65 KG/M2 | HEIGHT: 67 IN | WEIGHT: 208 LBS

## 2022-05-17 DIAGNOSIS — E10.65 TYPE 1 DIABETES MELLITUS WITH HYPERGLYCEMIA (HCC): ICD-10-CM

## 2022-05-17 DIAGNOSIS — E10.69 TYPE 1 DIABETES MELLITUS WITH OTHER SPECIFIED COMPLICATION (HCC): ICD-10-CM

## 2022-05-17 LAB
ALBUMIN SERPL-MCNC: 4.4 G/DL (ref 3.5–5.1)
ALP BLD-CCNC: 110 U/L (ref 38–126)
ALT SERPL-CCNC: 31 U/L (ref 11–66)
ANION GAP SERPL CALCULATED.3IONS-SCNC: 13 MEQ/L (ref 8–16)
AST SERPL-CCNC: 42 U/L (ref 5–40)
BILIRUB SERPL-MCNC: 0.7 MG/DL (ref 0.3–1.2)
BUN BLDV-MCNC: 13 MG/DL (ref 7–22)
CALCIUM SERPL-MCNC: 9.1 MG/DL (ref 8.5–10.5)
CHLORIDE BLD-SCNC: 103 MEQ/L (ref 98–111)
CHOLESTEROL, TOTAL: 232 MG/DL (ref 100–199)
CO2: 26 MEQ/L (ref 23–33)
CREAT SERPL-MCNC: 0.7 MG/DL (ref 0.4–1.2)
CREATININE, URINE: 383.2 MG/DL
GFR SERPL CREATININE-BSD FRML MDRD: > 90 ML/MIN/1.73M2
GLUCOSE BLD-MCNC: 77 MG/DL (ref 70–108)
HDLC SERPL-MCNC: 40 MG/DL
LDL CHOLESTEROL CALCULATED: 171 MG/DL
MICROALBUMIN UR-MCNC: 4.97 MG/DL
MICROALBUMIN/CREAT UR-RTO: 13 MG/G (ref 0–30)
POTASSIUM SERPL-SCNC: 4.2 MEQ/L (ref 3.5–5.2)
SODIUM BLD-SCNC: 142 MEQ/L (ref 135–145)
T4 FREE: 1.16 NG/DL (ref 0.93–1.76)
TOTAL PROTEIN: 6.9 G/DL (ref 6.1–8)
TRIGL SERPL-MCNC: 106 MG/DL (ref 0–199)
TSH SERPL DL<=0.05 MIU/L-ACNC: 1.26 UIU/ML (ref 0.4–4.2)

## 2022-05-17 PROCEDURE — 86337 INSULIN ANTIBODIES: CPT

## 2022-05-17 PROCEDURE — 84443 ASSAY THYROID STIM HORMONE: CPT

## 2022-05-17 PROCEDURE — 97802 MEDICAL NUTRITION INDIV IN: CPT | Performed by: DIETITIAN, REGISTERED

## 2022-05-17 PROCEDURE — 36415 COLL VENOUS BLD VENIPUNCTURE: CPT

## 2022-05-17 PROCEDURE — 80053 COMPREHEN METABOLIC PANEL: CPT

## 2022-05-17 PROCEDURE — 82043 UR ALBUMIN QUANTITATIVE: CPT

## 2022-05-17 PROCEDURE — 86341 ISLET CELL ANTIBODY: CPT

## 2022-05-17 PROCEDURE — 84439 ASSAY OF FREE THYROXINE: CPT

## 2022-05-17 PROCEDURE — 80061 LIPID PANEL: CPT

## 2022-05-17 PROCEDURE — 82985 ASSAY OF GLYCATED PROTEIN: CPT

## 2022-05-17 PROCEDURE — 83516 IMMUNOASSAY NONANTIBODY: CPT

## 2022-05-17 NOTE — PROGRESS NOTES
[address] [place] [address]. [address] [place] [address] Marcelo., Jose [facility]Emerson [facility], [address]  [phone] (phone)  [phone] (fax)    Patient Name: Tricia Becker. Date of Birth: [birth year]. MRN: [id number]      Assessment: Patient is a 50 y.o. male seen for Initial MNT visit for Type 1 DB.     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 Works 5 days/then 4 days/then 3 days. 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 Humalo - 24 units -  He states he \"guesses\" how much to take and will take corrections  Lanuts:  Dr Adames Abigail reduced down to 60 units (bedtime which is in the morning for him)  Metformin:  500 mg daily.    -Blood sugar trends: Did not bring meter and does not have a Dexcom CGM. Wakes @ 1 pm in the afternoon after working nights - ~   Drinks coffee with sweet cream which raises his BS - sweet cream [employer] then will do a correction. Getting ready for work BS will still be high 270 to low 300's then takes more insulin. 530 pm at work - will eat something - guys bring food into work from outside restaurants. 6-7 pm - 300's then BS's go down throughout the night while working  3 am - normal BS's 70 - 120  Then home to bed and has often awaken with hypo episodes - wife tries to wake him up. Called Ambulance - 5x this year. Pt also admits get combative and has the police out to there house.   Pt states they have run out of glucagon. Household includes wife and step daughter 15 yr.    -Food recall:   BS 47  This am.  Fasting for bloodwork done today. Then ate at [employer] - egg cheese mckeon mcmuffin and orange juice and 2 hashbrowns and sausage egg cheese mcmuffin. Did not take humalog for this. BS in office 273. He will pack leftovers for work occ. - He has a small frig. At work and can stock with food times. Lunch break at work - chicken wings smoked with different BBQ sauces - over 12. And added Rice and flavored ground beef with this. Bottles of diet pop and bags of brownie bites. Other food brought in - [address]. If in hurry - will go to [employer] and get 2 [place] gyro and jalapeno poppers. If not on calls will eat a lot more at work. When on a call. Will eat 630 - 730/8pm and again at 9 - 11 pm,  And eats again btw midnight - 6 am.    When days off work-   Brkf - english muffin, coffee with sweet cream.  Noon - snack - iced tea or diet pop and sandwich. Dinner - 6 pm - pasta - fettucini victor manuel and garlic bread. Bedtime snack: If BS going too low will drink Orange juice. At work - BS's more out of control. Corrects then when home sleeping goes low.    -Main Beverages: drinks water - flavored escamilla.      -Impression of Dietary Intake: in general, an \"unhealthy\" diet, on average, 3+ meals per day, on average, 7 or daily at work fast food meals per week, low fiber, high fat/ cholesterol, lacking routine intake of fruits and vegetables, carb excursions and overportioner - feasting often.     Current Outpatient Medications on File Prior to Visit   Medication Sig Dispense Refill    metFORMIN (GLUCOPHAGE) 500 MG tablet TAKE 1 TABLET DAILY 90 tablet 3    LANTUS SOLOSTAR 100 UNIT/ML injection pen INJECT 70 UNITS UNDER THE SKIN EVERY MORNING BEFORE BREAKFAST 60 mL 3    sildenafil (VIAGRA) 100 MG tablet Take 1 tablet by mouth as needed for Erectile Dysfunction 15 tablet 2    insulin lispro, 1 Unit Dial, (HUMALOG KWIKPEN) 100 UNIT/ML SOPN INJECT 2 UNITS OF INSULIN FOR EVERY 15 GRAMS OF CARBOHYDRATES 15:1 SLIDING SCALE 45 pen 3    Insulin Pen Needle (PEN NEEDLES 5/16\") 30G X 8 MM MISC Use 5x times per day with insulin 450 each 5    glucose blood VI test strips (YO CONTOUR NEXT TEST) strip Pt testing blood sugar 4 times daily. 200 strip 3    Continuous Blood Gluc Transmit (DEXCOM G6 TRANSMITTER) MISC Check blood sugars 4x/day (Patient not taking: Reported on 5/17/2022) 1 each 1    Continuous Blood Gluc Sensor (DEXCOM G6 SENSOR) MISC Check blood sugars 4x/day (Patient not taking: Reported on 5/17/2022) 3 each 2    Continuous Blood Gluc  (DEXCOM G6 ) GRACIELA Check blood sugars 4x/day (Patient not taking: Reported on 5/17/2022) 1 each 0    Glucagon, rDNA, (GLUCAGON EMERGENCY) 1 MG KIT Use as needed for low blood sugar (Patient not taking: Reported on 5/17/2022) 1 kit 3    Continuous Blood Gluc Sensor (DEXCOM G6 SENSOR) MISC Apply q 10 days (Patient not taking: Reported on 5/17/2022) 10 each 0    Continuous Blood Gluc Transmit (DEXCOM G6 TRANSMITTER) MISC Change q 3 months (Patient not taking: Reported on 5/17/2022) 5 each 0    Glucagon, rDNA, (GLUCAGON EMERGENCY) 1 MG KIT Inject 1 ampule as directed as needed (hypoglycemia) (Patient not taking: Reported on 5/17/2022) 1 kit 1    glucose (GLUTOSE 45) 40 % GEL Take 37.5 mLs by mouth See Admin Instructions (Patient not taking: Reported on 5/17/2022) 90 g 1     No current facility-administered medications on file prior to visit. Vitals from current and previous visits:  Temp 97.6 °F (36.4 °C)   Ht 5' 7\" (1.702 m)   Wt 208 lb (94.3 kg)   BMI 32.58 kg/m²     -Body mass index is 32.58 kg/m². 30-34.9 - Obesity Grade I.   -Weight goal: lose weight. Nutrition Diagnosis:   Food and nutrition-related knowledge deficit related to remote previous MNT/currently undergoing MNT as evidenced by Food recall.          Intervention:  -Impression: Encouraged can bring wife to DB center appts. Pt is an over-portioner chris at work around big guys who like to eat. -Instructed the patient on: Carbohydrate Counting, Consistent Carbohydrate Intake, Food Label Reading and Meal Planning for Regular, Balanced Meals & SnacksHow to treat a low BS    -Handouts given for: carbohydrate counting, healthy snacks, 200 gm sample meal plans/menus and fancy plate pictures, How to treat a low BS. Patient Instructions   1.)  Get familiar with reading the nutrition facts label by looking at serving size and total carbohydrates. - Without a label refer to your carb count guide booklet. 2.)  Measure foods for accuracy in carb counting.    3.)  Healthy carb choices:  whole grains, whole wheat pasta, starchy vegetables, fresh fruit and lowfat/non-fat milk and yogurt. 4.)  Your meal plan is found on the inside cover of your carb counting guide booklet:  1st meal or Brkf:  60 gms carbohydrates + 1-2 oz protein  2nd meal or Lunch: 60-75 gms carbohydrates + 2-3 oz protein + non-starchy veggies  3rd meal or Dinner:  60-75 gms carbohydrates + 2-3 oz protein + non- starchy veggies    Snack time:  15 - 20 gms carbohydrates + 1 oz protein    5.)  Choose lean protein most of the time and Cut in 1/2 added fats to help with weight loss efforts. 6.) Bring a 1-2 week food log and meter to next dietitian appt. Thanks. Check BS:  4x/day  Fasting BS (1st thing in the morning) or before a meal (at least 4 hour since last ate), BS goal:  90 - 130  2 hours after the start of a meal, BS goal:  100 - 150    7.)  Think about an exercise plan. Call the [facility] [place] [phone] to schedule your upcoming appointments with:  Joy Broderick -  center Pharmacist  Dietitian F/u appt with Ricky Roberts   And if you need to change Susannah Darden - Diabetes Educator appt on June 9th @ 3 pm.    -Nutrition prescription: 1600 - 1800 calories/day, 180 - 200 g carbs/day. Adj wt.  163# (74 kg)    Comprehension verified using teachback method. Monitoring/Evaluation:   -Followup visit: 3 weeks with RN-ANUSHAE. Pt has to check his schedule at home to make further scheduling appts with PharmD & dietitian.   -Receptiveness to education/goals: Agreeable.  -Evaluation of education: Indicates understanding.  -Readiness to change: contemplation - ambivalent about change measuring foods for carb counting and reading the nutrition facts label.  -Expected compliance: good. Thank you for your referral of this patient. Total time involved in direct patient education: 60 minutes for initial MNT visit.

## 2022-05-17 NOTE — PATIENT INSTRUCTIONS
1. )  Get familiar with reading the nutrition facts label by looking at serving size and total carbohydrates. - Without a label refer to your carb count guide booklet. 2.)  Measure foods for accuracy in carb counting.    3.)  Healthy carb choices:  whole grains, whole wheat pasta, starchy vegetables, fresh fruit and lowfat/non-fat milk and yogurt. 4.)  Your meal plan is found on the inside cover of your carb counting guide booklet:  1st meal or Brkf:  60 gms carbohydrates + 1-2 oz protein  2nd meal or Lunch: 60-75 gms carbohydrates + 2-3 oz protein + non-starchy veggies  3rd meal or Dinner:  60-75 gms carbohydrates + 2-3 oz protein + non- starchy veggies    Snack time:  15 - 20 gms carbohydrates + 1 oz protein    5.)  Choose lean protein most of the time and Cut in 1/2 added fats to help with weight loss efforts. 6.) Bring a 1-2 week food log and meter to next dietitian appt. Thanks. Check BS:  4x/day  Fasting BS (1st thing in the morning) or before a meal (at least 4 hour since last ate), BS goal:  90 - 130  2 hours after the start of a meal, BS goal:  100 - 150    7.)  Think about an exercise plan.     Call the [facility] [phone] to schedule your upcoming appointments with:  [facility] Pharmacist  Dietitian F/u appt with Tiffanie Sims   And if you need to change [facility] - Diabetes Educator appt on June 9th @ 3 pm.

## 2022-05-20 LAB
C-PEPTIDE: < 0.1 NG/ML (ref 0.5–3.3)
FRUCTOSAMINE: 341 UMOL/L (ref 205–285)
GLUTAMIC ACID DECARB AB: < 5 IU/ML (ref 0–5)
ISLET CELL ANTIBODY: NORMAL

## 2022-05-21 LAB — INSULIN ANTIBODIES: NORMAL

## 2023-01-04 ENCOUNTER — TELEPHONE (OUTPATIENT)
Dept: FAMILY MEDICINE CLINIC | Age: 53
End: 2023-01-04

## 2023-01-04 NOTE — TELEPHONE ENCOUNTER
----- Message from Dev Brady sent at 1/4/2023  2:11 PM EST -----  Subject: Appointment Request    Reason for Call: Established Patient Appointment needed: Routine Existing   Condition Follow Up (Diabetes)    QUESTIONS    Reason for appointment request? Other - [facility] can not book     Additional Information for Provider?  Pt needs appt for DM and refills, no   answer when calling office   ---------------------------------------------------------------------------  --------------  [address]  [phone]; OK to leave message on voicemail  ---------------------------------------------------------------------------  --------------  SCRIPT ANSWERS  COVID Screen: Garry Fletcher

## 2023-01-10 ENCOUNTER — OFFICE VISIT (OUTPATIENT)
Dept: FAMILY MEDICINE CLINIC | Age: 53
End: 2023-01-10
Payer: COMMERCIAL

## 2023-01-10 VITALS
HEART RATE: 82 BPM | TEMPERATURE: 97 F | DIASTOLIC BLOOD PRESSURE: 84 MMHG | RESPIRATION RATE: 16 BRPM | WEIGHT: 208.6 LBS | HEIGHT: 67 IN | OXYGEN SATURATION: 96 % | BODY MASS INDEX: 32.74 KG/M2 | SYSTOLIC BLOOD PRESSURE: 142 MMHG

## 2023-01-10 DIAGNOSIS — E10.65 TYPE 1 DIABETES MELLITUS WITH HYPERGLYCEMIA (HCC): Primary | ICD-10-CM

## 2023-01-10 DIAGNOSIS — Z71.6 ENCOUNTER FOR TOBACCO USE CESSATION COUNSELING: ICD-10-CM

## 2023-01-10 DIAGNOSIS — E11.8 DM (DIABETES MELLITUS) WITH COMPLICATIONS (HCC): ICD-10-CM

## 2023-01-10 DIAGNOSIS — E78.00 HYPERCHOLESTEROLEMIA: ICD-10-CM

## 2023-01-10 LAB
HBA1C MFR BLD: 8.7 %
HBA1C MFR BLD: 8.7 % (ref 4.3–5.7)

## 2023-01-10 PROCEDURE — 99406 BEHAV CHNG SMOKING 3-10 MIN: CPT | Performed by: NURSE PRACTITIONER

## 2023-01-10 PROCEDURE — 99215 OFFICE O/P EST HI 40 MIN: CPT | Performed by: NURSE PRACTITIONER

## 2023-01-10 PROCEDURE — 4004F PT TOBACCO SCREEN RCVD TLK: CPT | Performed by: NURSE PRACTITIONER

## 2023-01-10 PROCEDURE — 2022F DILAT RTA XM EVC RTNOPTHY: CPT | Performed by: NURSE PRACTITIONER

## 2023-01-10 PROCEDURE — 83036 HEMOGLOBIN GLYCOSYLATED A1C: CPT | Performed by: NURSE PRACTITIONER

## 2023-01-10 PROCEDURE — 3017F COLORECTAL CA SCREEN DOC REV: CPT | Performed by: NURSE PRACTITIONER

## 2023-01-10 PROCEDURE — G8484 FLU IMMUNIZE NO ADMIN: HCPCS | Performed by: NURSE PRACTITIONER

## 2023-01-10 PROCEDURE — G8417 CALC BMI ABV UP PARAM F/U: HCPCS | Performed by: NURSE PRACTITIONER

## 2023-01-10 PROCEDURE — G8428 CUR MEDS NOT DOCUMENT: HCPCS | Performed by: NURSE PRACTITIONER

## 2023-01-10 PROCEDURE — 3052F HG A1C>EQUAL 8.0%<EQUAL 9.0%: CPT | Performed by: NURSE PRACTITIONER

## 2023-01-10 RX ORDER — INSULIN GLARGINE 100 [IU]/ML
INJECTION, SOLUTION SUBCUTANEOUS
Qty: 60 ML | Refills: 3 | Status: SHIPPED | OUTPATIENT
Start: 2023-01-10

## 2023-01-10 RX ORDER — INSULIN LISPRO 100 [IU]/ML
INJECTION, SOLUTION INTRAVENOUS; SUBCUTANEOUS
Qty: 45 ADJUSTABLE DOSE PRE-FILLED PEN SYRINGE | Refills: 3 | Status: SHIPPED | OUTPATIENT
Start: 2023-01-10

## 2023-01-10 RX ORDER — BUPROPION HYDROCHLORIDE 150 MG/1
150 TABLET ORAL EVERY MORNING
Qty: 90 TABLET | Refills: 3 | Status: SHIPPED | OUTPATIENT
Start: 2023-01-10

## 2023-01-10 RX ORDER — IBUPROFEN 600 MG/1
TABLET ORAL
Qty: 1 KIT | Refills: 3 | Status: SHIPPED | OUTPATIENT
Start: 2023-01-10

## 2023-01-10 RX ORDER — PEN NEEDLE, DIABETIC 30 GX5/16"
NEEDLE, DISPOSABLE MISCELLANEOUS
Qty: 450 EACH | Refills: 5 | Status: SHIPPED | OUTPATIENT
Start: 2023-01-10

## 2023-01-10 RX ORDER — ATORVASTATIN CALCIUM 10 MG/1
10 TABLET, FILM COATED ORAL DAILY
Qty: 90 TABLET | Refills: 1 | Status: SHIPPED | OUTPATIENT
Start: 2023-01-10

## 2023-01-10 ASSESSMENT — PATIENT HEALTH QUESTIONNAIRE - PHQ9
2. FEELING DOWN, DEPRESSED OR HOPELESS: 0
SUM OF ALL RESPONSES TO PHQ QUESTIONS 1-9: 0
SUM OF ALL RESPONSES TO PHQ QUESTIONS 1-9: 0
1. LITTLE INTEREST OR PLEASURE IN DOING THINGS: 0
SUM OF ALL RESPONSES TO PHQ9 QUESTIONS 1 & 2: 0
SUM OF ALL RESPONSES TO PHQ QUESTIONS 1-9: 0
SUM OF ALL RESPONSES TO PHQ QUESTIONS 1-9: 0

## 2023-01-10 ASSESSMENT — ENCOUNTER SYMPTOMS: VISUAL CHANGE: 1

## 2023-01-10 NOTE — PROGRESS NOTES
[address] [address] [address] [place] [address]  Dept: [phone]  Loc: [phone]     Lenora Alvarado (:  1970) is a 50 y.o. male, here for evaluation of the following chief complaint(s):  Medication Refill      ASSESSMENT/PLAN:  1. Type 1 diabetes mellitus with hyperglycemia (HCC)  -     Glucagon, rDNA, (GLUCAGON EMERGENCY) 1 MG KIT; Use as needed for low blood sugar, Disp-1 kit, R-3Normal  2. DM (diabetes mellitus) with complications (HCC)  -     atorvastatin (LIPITOR) 10 MG tablet; Take 1 tablet by mouth daily, Disp-90 tablet, R-1Normal  -     POCT glycosylated hemoglobin (Hb A1C)  3. Encounter for tobacco use cessation counseling  -     buPROPion (WELLBUTRIN XL) 150 MG extended release tablet; Take 1 tablet by mouth every morning, Disp-90 tablet, R-3Normal  -     nicotine (NICOTROL) 10 MG inhaler; Inhale 1 puff into the lungs as needed for Smoking cessation, Disp-90 each, R-3Normal  -     IA TOBACCO USE CESSATION INTERMEDIATE 3-10 MINUTES  4. Hypercholesterolemia  -     atorvastatin (LIPITOR) 10 MG tablet; Take 1 tablet by mouth daily, Disp-90 tablet, R-1Normal    Advised patient to quit and offered support. Discussed prior reason for relapse and strategies to overcome this in the future. Recommended nicotine replacement with patches, reviewed use and dosing. Recommended nicotine replacement with patches, as well as the use of nicotine gum for breakthrough cravings. Prescribed bupropion. Patient denies history of seizures or eating disorder. Potential side effects and safe use reviewed. I spent approximately 7 minutes with patient today on smoking cessation counseling. I discussed the benefits of smoking cessation as well as the harms of continued smoking.     Recommend calling Dr Madalyn Mendes for follow up appt  And calling eye doctor for eye exam.     Return in about 3 months (around 4/10/2023) for dm check.    SUBJECTIVE/OBJECTIVE:  Diabetes  He presents for his follow-up diabetic visit. The initial diagnosis of diabetes was made 27 years ago. His disease course has been fluctuating. Hypoglycemia symptoms include confusion and sweats. Associated symptoms include polydipsia, polyuria and visual change. Pertinent negatives for diabetes include no chest pain, no foot paresthesias and no foot ulcerations. (2 years ago seen eye doctor) Hypoglycemia complications include hospitalization, nocturnal hypoglycemia and required glucagon injection. Symptoms are stable. Risk factors for coronary artery disease include male sex, obesity, dyslipidemia and tobacco exposure. Current diabetic treatment includes insulin injections. He is compliant with treatment all of the time. He is currently taking insulin pre-breakfast, pre-lunch and pre-dinner. Insulin injections are given by patient. His weight is decreasing rapidly. He is following a diabetic diet. He has had a previous visit with a dietitian. His breakfast blood glucose range is generally <70 mg/dl. An ACE inhibitor/angiotensin II receptor blocker is not being taken. He does not see a podiatrist.Eye exam is not current. Tobacco  cessation  Has tried in the past with pills and patches without success. Review of Systems   Cardiovascular:  Negative for chest pain. Endocrine: Positive for polydipsia and polyuria. Psychiatric/Behavioral:  Positive for confusion. Physical Exam  Constitutional:       Appearance: Normal appearance. Cardiovascular:      Rate and Rhythm: Regular rhythm. Pulmonary:      Effort: Pulmonary effort is normal.      Breath sounds: Normal breath sounds. Abdominal:      Palpations: Abdomen is soft. Skin:     General: Skin is warm and dry. Neurological:      Mental Status: He is alert and oriented to person, place, and time.    Psychiatric:         Mood and Affect: Mood normal.         Behavior: Behavior normal.         Thought Content: Thought content normal.         Judgment: Judgment normal.       Vitals:    01/10/23 1230   BP: (!) 142/84   Pulse: 82   Resp: 16   Temp: 97 °F (36.1 °C)   SpO2: 96%              An electronic signature was used to authenticate this note.     --JOSEFA Suarez - CNP

## 2023-06-21 DIAGNOSIS — E11.8 DM (DIABETES MELLITUS) WITH COMPLICATIONS (HCC): ICD-10-CM

## 2023-06-21 DIAGNOSIS — E78.00 HYPERCHOLESTEROLEMIA: ICD-10-CM

## 2023-06-21 RX ORDER — ATORVASTATIN CALCIUM 10 MG/1
TABLET, FILM COATED ORAL
Qty: 90 TABLET | Refills: 3 | Status: SHIPPED | OUTPATIENT
Start: 2023-06-21

## 2023-06-21 NOTE — TELEPHONE ENCOUNTER
Recent Visits  Date Type Provider Dept   01/10/23 Office Visit Heavenly Lozada APRN - CNP [facility]  [address]   03/08/22 Office Visit Alison Ndiaye, [address] recent visits within past 540 days with a meds authorizing provider and meeting all other requirements  Future Appointments  No visits were found meeting these conditions. Showing future appointments within next 150 days with a meds authorizing provider and meeting all other requirements   No future appointments.

## 2023-11-09 ENCOUNTER — OFFICE VISIT (OUTPATIENT)
Dept: FAMILY MEDICINE CLINIC | Age: 53
End: 2023-11-09

## 2023-11-09 VITALS
OXYGEN SATURATION: 98 % | WEIGHT: 209.6 LBS | HEART RATE: 97 BPM | TEMPERATURE: 97.3 F | DIASTOLIC BLOOD PRESSURE: 78 MMHG | RESPIRATION RATE: 14 BRPM | SYSTOLIC BLOOD PRESSURE: 138 MMHG | HEIGHT: 67 IN | BODY MASS INDEX: 32.9 KG/M2

## 2023-11-09 DIAGNOSIS — Z13.220 SCREENING FOR HYPERLIPIDEMIA: Primary | ICD-10-CM

## 2023-11-09 DIAGNOSIS — Z87.891 PERSONAL HISTORY OF TOBACCO USE: ICD-10-CM

## 2023-11-09 DIAGNOSIS — Z71.6 ENCOUNTER FOR TOBACCO USE CESSATION COUNSELING: ICD-10-CM

## 2023-11-09 DIAGNOSIS — E10.8 TYPE 1 DIABETES MELLITUS WITH COMPLICATION (HCC): ICD-10-CM

## 2023-11-09 DIAGNOSIS — R35.1 NOCTURIA: ICD-10-CM

## 2023-11-09 LAB — HBA1C MFR BLD: 9.1 % (ref 4.3–5.7)

## 2023-11-09 RX ORDER — ACYCLOVIR 400 MG/1
TABLET ORAL
Qty: 1 EACH | Refills: 5 | Status: SHIPPED | OUTPATIENT
Start: 2023-11-09

## 2023-11-09 RX ORDER — INSULIN GLARGINE 100 [IU]/ML
INJECTION, SOLUTION SUBCUTANEOUS
Qty: 60 ML | Refills: 3 | Status: SHIPPED | OUTPATIENT
Start: 2023-11-09

## 2023-11-09 RX ORDER — ACYCLOVIR 400 MG/1
1 TABLET ORAL
Qty: 3 EACH | Refills: 11 | Status: SHIPPED | OUTPATIENT
Start: 2023-11-09

## 2023-11-09 RX ORDER — INSULIN LISPRO 100 [IU]/ML
INJECTION, SOLUTION INTRAVENOUS; SUBCUTANEOUS
Qty: 45 ADJUSTABLE DOSE PRE-FILLED PEN SYRINGE | Refills: 3 | Status: SHIPPED | OUTPATIENT
Start: 2023-11-09

## 2023-11-09 NOTE — PATIENT INSTRUCTIONS
[facility]   [facility] Screening  What is screening for lung cancer? Lung cancer screening is a way to find some lung cancers early, before a person has any symptoms of the cancer. Lung cancer screening may help those who have the highest risk for lung cancer--people age 48 and older who are or were heavy smokers. For most people, who aren't at increased risk, screening for lung cancer probably isn't helpful. Screening won't prevent cancer. And it may not find all lung cancers. Lung cancer screening may lower the risk of dying from lung cancer in a small number of people. How is it done? Lung cancer screening is done with a low-dose CT (computed tomography) scan. A CT scan uses X-rays, or radiation, to make detailed pictures of your body. Experts recommend that screening be done in medical centers that focus on finding and treating lung cancer. Who is screening recommended for? Lung cancer screening is recommended for people age 48 and older who are or were heavy smokers. That means people with a smoking history of at least 20 pack years. A pack year is a way to measure how heavy a smoker you are or were. To figure out your pack years, multiply how many packs a day on average (assuming 20 cigarettes per pack) you have smoked by how many years you have smoked. For example: If you smoked 1 pack a day for 20 years, that's 1 times 20. So you have a smoking history of 20 pack years. If you smoked 2 packs a day for 10 years, that's 2 times 10. So you have a smoking history of 20 pack years. Experts agree that screening is for people who have a high risk of lung cancer. But experts don't agree on what high risk means. Some say people age 48 or older with at least a 20-pack-year smoking history are high risk. Others say it's people age 54 or older with a 30-pack-year history.   To see if you could benefit from screening, first find out if you are at high risk for

## 2023-11-15 ENCOUNTER — TELEPHONE (OUTPATIENT)
Dept: FAMILY MEDICINE CLINIC | Age: 53
End: 2023-11-15

## 2023-11-15 DIAGNOSIS — E10.8 TYPE 1 DIABETES MELLITUS WITH COMPLICATION (HCC): ICD-10-CM

## 2023-11-15 NOTE — TELEPHONE ENCOUNTER
A new script is needing to be sent to [facility] for pt's Humalog with a max dose written on the script. Pharmacy is unable to fill with out the max dose.

## 2023-11-20 ENCOUNTER — TELEPHONE (OUTPATIENT)
Dept: FAMILY MEDICINE CLINIC | Age: 53
End: 2023-11-20

## 2023-11-20 ENCOUNTER — HOSPITAL ENCOUNTER (OUTPATIENT)
Dept: CT IMAGING | Age: 53
Discharge: HOME | End: 2023-11-20
Payer: COMMERCIAL

## 2023-11-20 DIAGNOSIS — E10.8 TYPE 1 DIABETES MELLITUS WITH COMPLICATION (HCC): ICD-10-CM

## 2023-11-20 DIAGNOSIS — Z87.891 PERSONAL HISTORY OF TOBACCO USE: ICD-10-CM

## 2023-11-20 DIAGNOSIS — R84.9 ABNORMAL LABORATORY RESULTS FOR RESPIRATORY SYSTEM: Primary | ICD-10-CM

## 2023-11-20 PROCEDURE — 71271 CT THORAX LUNG CANCER SCR C-: CPT

## 2023-11-20 RX ORDER — INSULIN LISPRO 100 [IU]/ML
INJECTION, SOLUTION INTRAVENOUS; SUBCUTANEOUS
Qty: 45 ADJUSTABLE DOSE PRE-FILLED PEN SYRINGE | Refills: 3 | Status: SHIPPED | OUTPATIENT
Start: 2023-11-20

## 2023-11-20 NOTE — TELEPHONE ENCOUNTER
----- Message from JOSEFA Woods CNP sent at 11/20/2023 11:09 AM EST -----  Ct negative for any nodules or masses, recommend repeat in 1 year  Does show prominent pulmonary trunk, possible pulmonary arterial hypertension  I would like to follow this up with an ECHO to further evaluate if pt not opposed to this.   Thank you

## 2023-11-20 NOTE — TELEPHONE ENCOUNTER
Patient states that he is on a sliding scale. Patient states that prior to meals he takes 20-25 units of the Humalog. Patient states that he takes between 70-75 units daily.

## 2023-11-20 NOTE — TELEPHONE ENCOUNTER
Called patient and informed of message. Patient verbalized understanding,  No questions or concerns at this time. Is willing to complete echo, please order.   Thank you

## 2023-12-05 ENCOUNTER — HOSPITAL ENCOUNTER (OUTPATIENT)
Age: 53
Discharge: HOME | End: 2023-12-07
Payer: COMMERCIAL

## 2023-12-05 ENCOUNTER — HOSPITAL ENCOUNTER (OUTPATIENT)
Age: 53
Discharge: HOME | End: 2023-12-05
Payer: COMMERCIAL

## 2023-12-05 VITALS
BODY MASS INDEX: 32.8 KG/M2 | SYSTOLIC BLOOD PRESSURE: 138 MMHG | DIASTOLIC BLOOD PRESSURE: 78 MMHG | HEIGHT: 67 IN | WEIGHT: 209 LBS

## 2023-12-05 DIAGNOSIS — E10.8 TYPE 1 DIABETES MELLITUS WITH COMPLICATION (HCC): ICD-10-CM

## 2023-12-05 DIAGNOSIS — R84.9 ABNORMAL LABORATORY RESULTS FOR RESPIRATORY SYSTEM: ICD-10-CM

## 2023-12-05 LAB
ECHO AO ASC DIAM: 3.2 CM
ECHO AO ASCENDING AORTA INDEX: 1.55 CM/M2
ECHO AV CUSP MM: 2.3 CM
ECHO AV PEAK GRADIENT: 6 MMHG
ECHO AV PEAK VELOCITY: 1.2 M/S
ECHO AV VELOCITY RATIO: 0.92
ECHO BSA: 2.12 M2
ECHO LA AREA 2C: 14.3 CM2
ECHO LA AREA 4C: 15 CM2
ECHO LA DIAMETER INDEX: 1.46 CM/M2
ECHO LA DIAMETER: 3 CM
ECHO LA MAJOR AXIS: 5.4 CM
ECHO LA MINOR AXIS: 5 CM
ECHO LA VOL BP: 35 ML (ref 18–58)
ECHO LA VOL MOD A2C: 33 ML (ref 18–58)
ECHO LA VOL MOD A4C: 35 ML (ref 18–58)
ECHO LA VOL/BSA BIPLANE: 17 ML/M2 (ref 16–34)
ECHO LA VOLUME INDEX MOD A2C: 16 ML/M2 (ref 16–34)
ECHO LA VOLUME INDEX MOD A4C: 17 ML/M2 (ref 16–34)
ECHO LV E' LATERAL VELOCITY: 10 CM/S
ECHO LV E' SEPTAL VELOCITY: 6 CM/S
ECHO LV FRACTIONAL SHORTENING: 39 % (ref 28–44)
ECHO LV INTERNAL DIMENSION DIASTOLE INDEX: 2.14 CM/M2
ECHO LV INTERNAL DIMENSION DIASTOLIC: 4.4 CM (ref 4.2–5.9)
ECHO LV INTERNAL DIMENSION SYSTOLIC INDEX: 1.31 CM/M2
ECHO LV INTERNAL DIMENSION SYSTOLIC: 2.7 CM
ECHO LV ISOVOLUMETRIC RELAXATION TIME (IVRT): 77 MS
ECHO LV IVSD: 1 CM (ref 0.6–1)
ECHO LV MASS 2D: 147.8 G (ref 88–224)
ECHO LV MASS INDEX 2D: 71.8 G/M2 (ref 49–115)
ECHO LV POSTERIOR WALL DIASTOLIC: 1 CM (ref 0.6–1)
ECHO LV RELATIVE WALL THICKNESS RATIO: 0.45
ECHO LVOT PEAK GRADIENT: 5 MMHG
ECHO LVOT PEAK VELOCITY: 1.1 M/S
ECHO MV A VELOCITY: 0.71 M/S
ECHO MV E DECELERATION TIME (DT): 225 MS
ECHO MV E VELOCITY: 0.72 M/S
ECHO MV E/A RATIO: 1.01
ECHO MV E/E' LATERAL: 7.2
ECHO MV E/E' RATIO (AVERAGED): 9.6
ECHO PULMONARY ARTERY END DIASTOLIC PRESSURE: 4 MMHG
ECHO PV MAX VELOCITY: 0.7 M/S
ECHO PV PEAK GRADIENT: 2 MMHG
ECHO PV REGURGITANT MAX VELOCITY: 0.9 M/S
ECHO RV INTERNAL DIMENSION: 2.7 CM
ECHO RV TAPSE: 1.9 CM (ref 1.7–?)
ECHO TV E WAVE: 0.5 M/S

## 2023-12-05 PROCEDURE — 93306 TTE W/DOPPLER COMPLETE: CPT

## 2023-12-05 PROCEDURE — 93306 TTE W/DOPPLER COMPLETE: CPT | Performed by: INTERNAL MEDICINE

## 2023-12-05 PROCEDURE — 84153 ASSAY OF PSA TOTAL: CPT

## 2023-12-05 PROCEDURE — 36415 COLL VENOUS BLD VENIPUNCTURE: CPT

## 2023-12-05 PROCEDURE — 80053 COMPREHEN METABOLIC PANEL: CPT

## 2023-12-05 PROCEDURE — 80061 LIPID PANEL: CPT

## 2023-12-05 PROCEDURE — 82043 UR ALBUMIN QUANTITATIVE: CPT

## 2023-12-06 ENCOUNTER — TELEPHONE (OUTPATIENT)
Dept: FAMILY MEDICINE CLINIC | Age: 53
End: 2023-12-06

## 2023-12-06 NOTE — TELEPHONE ENCOUNTER
----- Message from JOSEFA Cortes CNP sent at 12/6/2023  1:21 PM EST -----  ECHO looked ok.   Ejection fraction was normal.

## 2024-03-29 ENCOUNTER — COMMUNITY OUTREACH (OUTPATIENT)
Dept: FAMILY MEDICINE CLINIC | Age: 54
End: 2024-03-29

## 2024-03-29 NOTE — PROGRESS NOTES
Patient's HM shows they are overdue for Colorectal Screening.   Care Everywhere and  files searched.  No results to attach to order nor HM updated.  Declines CRS.

## 2024-06-17 DIAGNOSIS — E78.00 HYPERCHOLESTEROLEMIA: ICD-10-CM

## 2024-06-17 DIAGNOSIS — E11.8 DM (DIABETES MELLITUS) WITH COMPLICATIONS (HCC): ICD-10-CM

## 2024-06-17 RX ORDER — ATORVASTATIN CALCIUM 10 MG/1
TABLET, FILM COATED ORAL
Qty: 90 TABLET | Refills: 3 | Status: SHIPPED | OUTPATIENT
Start: 2024-06-17

## 2024-06-17 NOTE — TELEPHONE ENCOUNTER
No future appointments.   Recent Visits  Date Type Provider Dept   11/09/23 Office Visit Kayla Dang APRN - CNP [facility]   01/10/23 Office Visit Kayla Dang APRN - CNP [facility]   Showing recent visits within past 540 days with a meds authorizing provider and meeting all other requirements  Future Appointments  No visits were found meeting these conditions.  Showing future appointments within next 150 days with a meds authorizing provider and meeting all other requirements

## 2024-07-28 NOTE — PROGRESS NOTES
accurate at the time of visit  Patient's past medical, surgical, social and family history were reviewed and updated     All patient questions answered.  Patient voiced understanding.

## 2024-07-29 ENCOUNTER — OFFICE VISIT (OUTPATIENT)
Dept: FAMILY MEDICINE CLINIC | Age: 54
End: 2024-07-29
Payer: COMMERCIAL

## 2024-07-29 VITALS
RESPIRATION RATE: 14 BRPM | DIASTOLIC BLOOD PRESSURE: 72 MMHG | OXYGEN SATURATION: 97 % | HEIGHT: 67 IN | WEIGHT: 219.8 LBS | BODY MASS INDEX: 34.5 KG/M2 | SYSTOLIC BLOOD PRESSURE: 158 MMHG | HEART RATE: 100 BPM | TEMPERATURE: 98.4 F

## 2024-07-29 DIAGNOSIS — E78.5 DYSLIPIDEMIA: ICD-10-CM

## 2024-07-29 DIAGNOSIS — E10.65 TYPE 1 DIABETES MELLITUS WITH HYPERGLYCEMIA (HCC): ICD-10-CM

## 2024-07-29 DIAGNOSIS — I10 ESSENTIAL HYPERTENSION: Primary | ICD-10-CM

## 2024-07-29 LAB
HBA1C MFR BLD: 8 % (ref 4.3–5.7)
MICROALB/CREAT RATIO POC: < 30 MG/G
MICROALBUMIN/CREAT UR-RTO: 30 MG/L
POC CREATININE, URINE: 300 MG/DL

## 2024-07-29 PROCEDURE — 4004F PT TOBACCO SCREEN RCVD TLK: CPT | Performed by: NURSE PRACTITIONER

## 2024-07-29 PROCEDURE — 3078F DIAST BP <80 MM HG: CPT | Performed by: NURSE PRACTITIONER

## 2024-07-29 PROCEDURE — G8427 DOCREV CUR MEDS BY ELIG CLIN: HCPCS | Performed by: NURSE PRACTITIONER

## 2024-07-29 PROCEDURE — 3052F HG A1C>EQUAL 8.0%<EQUAL 9.0%: CPT | Performed by: NURSE PRACTITIONER

## 2024-07-29 PROCEDURE — G8417 CALC BMI ABV UP PARAM F/U: HCPCS | Performed by: NURSE PRACTITIONER

## 2024-07-29 PROCEDURE — 3017F COLORECTAL CA SCREEN DOC REV: CPT | Performed by: NURSE PRACTITIONER

## 2024-07-29 PROCEDURE — 99214 OFFICE O/P EST MOD 30 MIN: CPT | Performed by: NURSE PRACTITIONER

## 2024-07-29 PROCEDURE — 2022F DILAT RTA XM EVC RTNOPTHY: CPT | Performed by: NURSE PRACTITIONER

## 2024-07-29 PROCEDURE — 3077F SYST BP >= 140 MM HG: CPT | Performed by: NURSE PRACTITIONER

## 2024-07-29 RX ORDER — ACYCLOVIR 400 MG/1
TABLET ORAL
Qty: 1 EACH | Refills: 5 | Status: SHIPPED | OUTPATIENT
Start: 2024-07-29

## 2024-07-29 RX ORDER — PEN NEEDLE, DIABETIC 30 GX5/16"
NEEDLE, DISPOSABLE MISCELLANEOUS
Qty: 450 EACH | Refills: 5 | Status: SHIPPED | OUTPATIENT
Start: 2024-07-29

## 2024-07-29 RX ORDER — ATORVASTATIN CALCIUM 10 MG/1
10 TABLET, FILM COATED ORAL DAILY
Qty: 90 TABLET | Refills: 3 | Status: SHIPPED | OUTPATIENT
Start: 2024-07-29

## 2024-07-29 RX ORDER — LISINOPRIL 5 MG/1
5 TABLET ORAL DAILY
Qty: 90 TABLET | Refills: 3 | Status: SHIPPED | OUTPATIENT
Start: 2024-07-29

## 2024-07-29 RX ORDER — ACYCLOVIR 400 MG/1
1 TABLET ORAL
Qty: 3 EACH | Refills: 11 | Status: SHIPPED | OUTPATIENT
Start: 2024-07-29

## 2024-07-29 SDOH — ECONOMIC STABILITY: FOOD INSECURITY: WITHIN THE PAST 12 MONTHS, THE FOOD YOU BOUGHT JUST DIDN'T LAST AND YOU DIDN'T HAVE MONEY TO GET MORE.: NEVER TRUE

## 2024-07-29 SDOH — ECONOMIC STABILITY: FOOD INSECURITY: WITHIN THE PAST 12 MONTHS, YOU WORRIED THAT YOUR FOOD WOULD RUN OUT BEFORE YOU GOT MONEY TO BUY MORE.: NEVER TRUE

## 2024-07-29 SDOH — ECONOMIC STABILITY: HOUSING INSECURITY
IN THE LAST 12 MONTHS, WAS THERE A TIME WHEN YOU DID NOT HAVE A STEADY PLACE TO SLEEP OR SLEPT IN A SHELTER (INCLUDING NOW)?: NO

## 2024-07-29 SDOH — ECONOMIC STABILITY: INCOME INSECURITY: HOW HARD IS IT FOR YOU TO PAY FOR THE VERY BASICS LIKE FOOD, HOUSING, MEDICAL CARE, AND HEATING?: NOT HARD AT ALL

## 2024-07-29 ASSESSMENT — PATIENT HEALTH QUESTIONNAIRE - PHQ9
SUM OF ALL RESPONSES TO PHQ QUESTIONS 1-9: 0
SUM OF ALL RESPONSES TO PHQ QUESTIONS 1-9: 0
SUM OF ALL RESPONSES TO PHQ9 QUESTIONS 1 & 2: 0
SUM OF ALL RESPONSES TO PHQ QUESTIONS 1-9: 0
SUM OF ALL RESPONSES TO PHQ QUESTIONS 1-9: 0
1. LITTLE INTEREST OR PLEASURE IN DOING THINGS: NOT AT ALL
SUM OF ALL RESPONSES TO PHQ9 QUESTIONS 1 & 2: 0
1. LITTLE INTEREST OR PLEASURE IN DOING THINGS: NOT AT ALL
2. FEELING DOWN, DEPRESSED OR HOPELESS: NOT AT ALL
2. FEELING DOWN, DEPRESSED OR HOPELESS: NOT AT ALL

## 2024-07-30 ASSESSMENT — ENCOUNTER SYMPTOMS
SHORTNESS OF BREATH: 0
CHEST TIGHTNESS: 0

## 2024-11-11 ENCOUNTER — HOSPITAL ENCOUNTER (OUTPATIENT)
Age: 54
Discharge: HOME | End: 2024-11-11
Payer: COMMERCIAL

## 2024-11-11 ENCOUNTER — OFFICE VISIT (OUTPATIENT)
Dept: FAMILY MEDICINE CLINIC | Age: 54
End: 2024-11-11
Payer: COMMERCIAL

## 2024-11-11 VITALS
SYSTOLIC BLOOD PRESSURE: 168 MMHG | OXYGEN SATURATION: 97 % | HEIGHT: 67 IN | DIASTOLIC BLOOD PRESSURE: 88 MMHG | RESPIRATION RATE: 16 BRPM | BODY MASS INDEX: 35.22 KG/M2 | HEART RATE: 101 BPM | TEMPERATURE: 97.6 F | WEIGHT: 224.4 LBS

## 2024-11-11 DIAGNOSIS — E10.65 TYPE 1 DIABETES MELLITUS WITH HYPERGLYCEMIA (HCC): ICD-10-CM

## 2024-11-11 DIAGNOSIS — I10 ESSENTIAL HYPERTENSION: ICD-10-CM

## 2024-11-11 DIAGNOSIS — R35.1 NOCTURIA: ICD-10-CM

## 2024-11-11 DIAGNOSIS — R07.9 CHEST PAIN, UNSPECIFIED TYPE: Primary | ICD-10-CM

## 2024-11-11 DIAGNOSIS — R06.02 SHORTNESS OF BREATH: ICD-10-CM

## 2024-11-11 DIAGNOSIS — R94.31 ABNORMAL ELECTROCARDIOGRAPHY: ICD-10-CM

## 2024-11-11 LAB
ALBUMIN SERPL BCG-MCNC: 3.9 G/DL (ref 3.5–5.1)
ALP SERPL-CCNC: 108 U/L (ref 38–126)
ALT SERPL W/O P-5'-P-CCNC: 27 U/L (ref 11–66)
ANION GAP SERPL CALC-SCNC: 10 MEQ/L (ref 8–16)
AST SERPL-CCNC: 21 U/L (ref 5–40)
BASOPHILS ABSOLUTE: 0 THOU/MM3 (ref 0–0.1)
BASOPHILS NFR BLD AUTO: 0.4 %
BILIRUB SERPL-MCNC: 0.2 MG/DL (ref 0.3–1.2)
BUN SERPL-MCNC: 9 MG/DL (ref 7–22)
CALCIUM SERPL-MCNC: 9.1 MG/DL (ref 8.5–10.5)
CHLORIDE SERPL-SCNC: 107 MEQ/L (ref 98–111)
CO2 SERPL-SCNC: 25 MEQ/L (ref 23–33)
CREAT SERPL-MCNC: 0.6 MG/DL (ref 0.4–1.2)
CREAT UR-MCNC: 211.7 MG/DL
DEPRECATED RDW RBC AUTO: 46.5 FL (ref 35–45)
EOSINOPHIL NFR BLD AUTO: 2.7 %
EOSINOPHILS ABSOLUTE: 0.2 THOU/MM3 (ref 0–0.4)
ERYTHROCYTE [DISTWIDTH] IN BLOOD BY AUTOMATED COUNT: 13.2 % (ref 11.5–14.5)
GFR SERPL CREATININE-BSD FRML MDRD: > 90 ML/MIN/1.73M2
GLUCOSE SERPL-MCNC: 139 MG/DL (ref 70–108)
HBA1C MFR BLD: 7.5 % (ref 4.3–5.7)
HCT VFR BLD AUTO: 43.7 % (ref 42–52)
HGB BLD-MCNC: 14.9 GM/DL (ref 14–18)
IMM GRANULOCYTES # BLD AUTO: 0.02 THOU/MM3 (ref 0–0.07)
IMM GRANULOCYTES NFR BLD AUTO: 0.2 %
LYMPHOCYTES ABSOLUTE: 2.5 THOU/MM3 (ref 1–4.8)
LYMPHOCYTES NFR BLD AUTO: 30.3 %
MCH RBC QN AUTO: 32.7 PG (ref 26–33)
MCHC RBC AUTO-ENTMCNC: 34.1 GM/DL (ref 32.2–35.5)
MCV RBC AUTO: 96 FL (ref 80–94)
MICROALBUMIN UR-MCNC: < 1.2 MG/DL
MICROALBUMIN/CREAT RATIO PNL UR: 6 MG/G (ref 0–30)
MONOCYTES ABSOLUTE: 0.7 THOU/MM3 (ref 0.4–1.3)
MONOCYTES NFR BLD AUTO: 8.9 %
NEUTROPHILS ABSOLUTE: 4.8 THOU/MM3 (ref 1.8–7.7)
NEUTROPHILS NFR BLD AUTO: 57.5 %
NRBC BLD AUTO-RTO: 0 /100 WBC
PLATELET # BLD AUTO: 322 THOU/MM3 (ref 130–400)
PMV BLD AUTO: 11.6 FL (ref 9.4–12.4)
POTASSIUM SERPL-SCNC: 4.7 MEQ/L (ref 3.5–5.2)
PROT SERPL-MCNC: 6.9 G/DL (ref 6.1–8)
RBC # BLD AUTO: 4.55 MILL/MM3 (ref 4.7–6.1)
SODIUM SERPL-SCNC: 142 MEQ/L (ref 135–145)
TSH SERPL DL<=0.005 MIU/L-ACNC: 2.01 UIU/ML (ref 0.4–4.2)
WBC # BLD AUTO: 8.4 THOU/MM3 (ref 4.8–10.8)

## 2024-11-11 PROCEDURE — G8427 DOCREV CUR MEDS BY ELIG CLIN: HCPCS | Performed by: NURSE PRACTITIONER

## 2024-11-11 PROCEDURE — 84153 ASSAY OF PSA TOTAL: CPT

## 2024-11-11 PROCEDURE — 3051F HG A1C>EQUAL 7.0%<8.0%: CPT | Performed by: NURSE PRACTITIONER

## 2024-11-11 PROCEDURE — 85025 COMPLETE CBC W/AUTO DIFF WBC: CPT

## 2024-11-11 PROCEDURE — 80053 COMPREHEN METABOLIC PANEL: CPT

## 2024-11-11 PROCEDURE — 2022F DILAT RTA XM EVC RTNOPTHY: CPT | Performed by: NURSE PRACTITIONER

## 2024-11-11 PROCEDURE — 4004F PT TOBACCO SCREEN RCVD TLK: CPT | Performed by: NURSE PRACTITIONER

## 2024-11-11 PROCEDURE — 84443 ASSAY THYROID STIM HORMONE: CPT

## 2024-11-11 PROCEDURE — G8417 CALC BMI ABV UP PARAM F/U: HCPCS | Performed by: NURSE PRACTITIONER

## 2024-11-11 PROCEDURE — 3017F COLORECTAL CA SCREEN DOC REV: CPT | Performed by: NURSE PRACTITIONER

## 2024-11-11 PROCEDURE — 3079F DIAST BP 80-89 MM HG: CPT | Performed by: NURSE PRACTITIONER

## 2024-11-11 PROCEDURE — 36415 COLL VENOUS BLD VENIPUNCTURE: CPT

## 2024-11-11 PROCEDURE — G8484 FLU IMMUNIZE NO ADMIN: HCPCS | Performed by: NURSE PRACTITIONER

## 2024-11-11 PROCEDURE — 93000 ELECTROCARDIOGRAM COMPLETE: CPT | Performed by: NURSE PRACTITIONER

## 2024-11-11 PROCEDURE — 99214 OFFICE O/P EST MOD 30 MIN: CPT | Performed by: NURSE PRACTITIONER

## 2024-11-11 PROCEDURE — 82043 UR ALBUMIN QUANTITATIVE: CPT

## 2024-11-11 PROCEDURE — 3077F SYST BP >= 140 MM HG: CPT | Performed by: NURSE PRACTITIONER

## 2024-11-11 RX ORDER — ASPIRIN 81 MG/1
81 TABLET ORAL DAILY
Qty: 90 TABLET | Refills: 0 | Status: SHIPPED | OUTPATIENT
Start: 2024-11-11

## 2024-11-11 RX ORDER — AMLODIPINE AND BENAZEPRIL HYDROCHLORIDE 5; 20 MG/1; MG/1
1 CAPSULE ORAL DAILY
Qty: 90 CAPSULE | Refills: 3 | Status: SHIPPED | OUTPATIENT
Start: 2024-11-11 | End: 2025-11-11

## 2024-11-11 NOTE — PROGRESS NOTES
Neo Esqueda is a 54 y.o. male thatpresents for Hypertension      History obtained today from Patient.    HPI    HTN  Was elevated at home,   Started on Lisinopril 5 mg last visit.  Getting migraines so increased to Lisinopril 5 pills at one time per patient. Still getting headaches. And higher numbers at home.  148 systolic average   79 average diastolic   Now out of the lisinopril for 5-6 days   Cut out caffeine  Headaches daily (on one side of head)      Chest pain  Chest tightness- on right side, happens at work. Walking to car will happen, notes its about 300 yards away, and 100 yards into it, the pain starts  He has to sit in car for a bit then it goes away  Some SOB  Current smoker    Type 1 diabetes  Has been referred to Dr Kahn   Hasn't scheduled yet due to work  A1C down to 7.5%, staying away from sweets, trying not to overeat  But has been more consistent with taking insulin  Eye exam due    HLD    On Lipitor    I have reviewed the patient's past medical history, past surgical history, allergies,medications, social and family history and I have made updates where appropriate.    Past Medical History:   Diagnosis Date    Hyperlipidemia 2012    Type 1 diabetes mellitus (HCC) 1995    at age 26       Social History     Tobacco Use    Smoking status: Every Day     Current packs/day: 1.00     Average packs/day: 1 pack/day for 20.0 years (20.0 ttl pk-yrs)     Types: Cigarettes    Smokeless tobacco: Never   Substance Use Topics    Alcohol use: Yes     Comment: social    Drug use: No       Family History   Problem Relation Age of Onset    Asthma Mother     High Blood Pressure Father 50    Asthma Sister          Review of Systems    Review of Systems    Constitutional: Negative for Fever, Chills, Fatigue  Cardiovascular: +Chest Pain, Palpitations  Respiratory:  Negative for Cough, Wheezing, Shortness of breath  Gastrointestinal:  Nausea, Vomiting, Diarrhea, Constipation, Blood in stool  Genitourinary:  Negative

## 2024-11-12 ENCOUNTER — TELEPHONE (OUTPATIENT)
Dept: FAMILY MEDICINE CLINIC | Age: 54
End: 2024-11-12

## 2024-11-12 LAB — PSA SERPL-MCNC: 0.52 NG/ML (ref 0–1)

## 2024-11-12 NOTE — TELEPHONE ENCOUNTER
Pt called and they said they didn't do the Stress test, they are going to do a Cardiac Cath after getting approval from insurance. Pt was also put on a couple new mediations, isosorbide mononitrate 30mg 1x daily. Metoprolol tartrate 25 mg 1x daily.    2 seconds or less

## 2024-11-12 NOTE — TELEPHONE ENCOUNTER
Patient informed and verbalized understanding. No questions at this time, will call and let provider know what cardiologist says

## 2024-11-12 NOTE — TELEPHONE ENCOUNTER
----- Message from JOSEFA Sauceda CNP sent at 11/12/2024  7:54 AM EST -----  Labs are stable. No changes at this time  Let me know what cardiology says after todays visit

## 2024-12-18 DIAGNOSIS — E10.8 TYPE 1 DIABETES MELLITUS WITH COMPLICATION (HCC): ICD-10-CM

## 2024-12-18 RX ORDER — INSULIN LISPRO 100 [IU]/ML
INJECTION, SOLUTION INTRAVENOUS; SUBCUTANEOUS
Qty: 75 ML | Refills: 3 | Status: SHIPPED | OUTPATIENT
Start: 2024-12-18

## 2024-12-18 NOTE — TELEPHONE ENCOUNTER
Recent Visits  Date Type Provider Dept   11/11/24 Office Visit Kayla Dang APRN - CNP [facility]   07/29/24 Office Visit Kayla Dang APRN - CNP [facility]   11/09/23 Office Visit Kayla Dang APRN - CNP [facility]   Showing recent visits within past 540 days with a meds authorizing provider and meeting all other requirements  Future Appointments  No visits were found meeting these conditions.  Showing future appointments within next 150 days with a meds authorizing provider and meeting all other requirements

## 2025-03-18 ENCOUNTER — OFFICE VISIT (OUTPATIENT)
Dept: FAMILY MEDICINE CLINIC | Age: 55
End: 2025-03-18

## 2025-03-18 VITALS
DIASTOLIC BLOOD PRESSURE: 86 MMHG | WEIGHT: 223.8 LBS | RESPIRATION RATE: 12 BRPM | HEIGHT: 67 IN | SYSTOLIC BLOOD PRESSURE: 132 MMHG | BODY MASS INDEX: 35.12 KG/M2 | HEART RATE: 72 BPM

## 2025-03-18 DIAGNOSIS — I10 ESSENTIAL HYPERTENSION: ICD-10-CM

## 2025-03-18 DIAGNOSIS — E78.5 DYSLIPIDEMIA: ICD-10-CM

## 2025-03-18 DIAGNOSIS — Z12.11 SCREENING FOR COLON CANCER: ICD-10-CM

## 2025-03-18 DIAGNOSIS — E10.8 TYPE 1 DIABETES MELLITUS WITH COMPLICATION (HCC): ICD-10-CM

## 2025-03-18 DIAGNOSIS — E10.65 TYPE 1 DIABETES MELLITUS WITH HYPERGLYCEMIA (HCC): Primary | ICD-10-CM

## 2025-03-18 LAB
CHP ED QC CHECK: ABNORMAL
GLUCOSE BLD-MCNC: 294 MG/DL
HBA1C MFR BLD: 8.6 %

## 2025-03-18 PROCEDURE — 99204 OFFICE O/P NEW MOD 45 MIN: CPT | Performed by: EMERGENCY MEDICINE

## 2025-03-18 PROCEDURE — 83036 HEMOGLOBIN GLYCOSYLATED A1C: CPT | Performed by: EMERGENCY MEDICINE

## 2025-03-18 PROCEDURE — 82962 GLUCOSE BLOOD TEST: CPT | Performed by: EMERGENCY MEDICINE

## 2025-03-18 PROCEDURE — 3017F COLORECTAL CA SCREEN DOC REV: CPT | Performed by: EMERGENCY MEDICINE

## 2025-03-18 PROCEDURE — G8417 CALC BMI ABV UP PARAM F/U: HCPCS | Performed by: EMERGENCY MEDICINE

## 2025-03-18 PROCEDURE — G8427 DOCREV CUR MEDS BY ELIG CLIN: HCPCS | Performed by: EMERGENCY MEDICINE

## 2025-03-18 RX ORDER — LISINOPRIL 5 MG/1
5 TABLET ORAL DAILY
Qty: 90 TABLET | Refills: 1 | Status: SHIPPED | OUTPATIENT
Start: 2025-03-18

## 2025-03-18 RX ORDER — ATORVASTATIN CALCIUM 10 MG/1
10 TABLET, FILM COATED ORAL DAILY
Qty: 90 TABLET | Refills: 1 | Status: SHIPPED | OUTPATIENT
Start: 2025-03-18

## 2025-03-18 RX ORDER — INSULIN LISPRO 100 [IU]/ML
30 INJECTION, SOLUTION INTRAVENOUS; SUBCUTANEOUS
Qty: 27 ADJUSTABLE DOSE PRE-FILLED PEN SYRINGE | Refills: 1 | Status: SHIPPED | OUTPATIENT
Start: 2025-03-18 | End: 2025-09-14

## 2025-03-18 RX ORDER — INSULIN GLARGINE 100 [IU]/ML
70 INJECTION, SOLUTION SUBCUTANEOUS NIGHTLY
Qty: 21 ADJUSTABLE DOSE PRE-FILLED PEN SYRINGE | Refills: 1 | Status: SHIPPED | OUTPATIENT
Start: 2025-03-18

## 2025-03-18 SDOH — ECONOMIC STABILITY: FOOD INSECURITY: WITHIN THE PAST 12 MONTHS, THE FOOD YOU BOUGHT JUST DIDN'T LAST AND YOU DIDN'T HAVE MONEY TO GET MORE.: NEVER TRUE

## 2025-03-18 SDOH — ECONOMIC STABILITY: FOOD INSECURITY: WITHIN THE PAST 12 MONTHS, YOU WORRIED THAT YOUR FOOD WOULD RUN OUT BEFORE YOU GOT MONEY TO BUY MORE.: NEVER TRUE

## 2025-03-18 ASSESSMENT — PATIENT HEALTH QUESTIONNAIRE - PHQ9
2. FEELING DOWN, DEPRESSED OR HOPELESS: NOT AT ALL
1. LITTLE INTEREST OR PLEASURE IN DOING THINGS: NOT AT ALL
SUM OF ALL RESPONSES TO PHQ QUESTIONS 1-9: 0

## 2025-03-18 ASSESSMENT — ENCOUNTER SYMPTOMS
RHINORRHEA: 0
TROUBLE SWALLOWING: 0
COUGH: 0
BACK PAIN: 0
CONSTIPATION: 0
SINUS PRESSURE: 0
VOMITING: 0
CHEST TIGHTNESS: 0
WHEEZING: 0
ABDOMINAL PAIN: 0
SHORTNESS OF BREATH: 0
DIARRHEA: 0
NAUSEA: 0
SORE THROAT: 0
VOICE CHANGE: 0

## 2025-03-18 NOTE — PROGRESS NOTES
11/11/2024         Medications Prescribed:  Orders Placed This Encounter   Medications    LANTUS SOLOSTAR 100 UNIT/ML injection pen     Sig: Inject 70 Units into the skin nightly INJECT 60 UNITS UNDER THE SKIN EVERY MORNING BEFORE BREAKFAST     Dispense:  21 Adjustable Dose Pre-filled Pen Syringe     Refill:  1    atorvastatin (LIPITOR) 10 MG tablet     Sig: Take 1 tablet by mouth daily     Dispense:  90 tablet     Refill:  1    lisinopril (PRINIVIL;ZESTRIL) 5 MG tablet     Sig: Take 1 tablet by mouth daily     Dispense:  90 tablet     Refill:  1    insulin lispro, 1 Unit Dial, (HUMALOG KWIKPEN) 100 UNIT/ML SOPN     Sig: Inject 30 Units into the skin 3 times daily (before meals)     Dispense:  27 Adjustable Dose Pre-filled Pen Syringe     Refill:  1       Return in about 3 months (around 6/18/2025) for DM.     Discusseduse, benefit, and side effects of prescribed medications. Current home medications were reviewed and updated with the patient.  All patient questionsanswered.  Pt voiced understanding.  Instructed to continue current medications,diet and exercise.  Patient agreed with treatment plan.     Previous Notes, Consultations Notes, previous Laboratories available were reviewed with the patient during this visit:

## 2025-04-08 ENCOUNTER — RESULTS FOLLOW-UP (OUTPATIENT)
Dept: FAMILY MEDICINE CLINIC | Age: 55
End: 2025-04-08

## 2025-04-08 ENCOUNTER — HOSPITAL ENCOUNTER (OUTPATIENT)
Age: 55
Discharge: HOME | End: 2025-04-08
Payer: COMMERCIAL

## 2025-04-08 DIAGNOSIS — E78.5 DYSLIPIDEMIA: ICD-10-CM

## 2025-04-08 DIAGNOSIS — I10 ESSENTIAL HYPERTENSION: ICD-10-CM

## 2025-04-08 DIAGNOSIS — E10.65 TYPE 1 DIABETES MELLITUS WITH HYPERGLYCEMIA (HCC): ICD-10-CM

## 2025-04-08 LAB
ALBUMIN SERPL BCG-MCNC: 4.1 G/DL (ref 3.4–4.9)
ALP SERPL-CCNC: 107 U/L (ref 40–129)
ALT SERPL W/O P-5'-P-CCNC: 29 U/L (ref 10–50)
ANION GAP SERPL CALC-SCNC: 11 MEQ/L (ref 8–16)
AST SERPL-CCNC: 25 U/L (ref 10–50)
BILIRUB SERPL-MCNC: 0.4 MG/DL (ref 0.3–1.2)
BUN SERPL-MCNC: 13 MG/DL (ref 8–23)
CALCIUM SERPL-MCNC: 9.5 MG/DL (ref 8.6–10)
CHLORIDE SERPL-SCNC: 105 MEQ/L (ref 98–111)
CHOLEST SERPL-MCNC: 256 MG/DL (ref 100–199)
CO2 SERPL-SCNC: 26 MEQ/L (ref 22–29)
CREAT SERPL-MCNC: 0.8 MG/DL (ref 0.7–1.2)
GFR SERPL CREATININE-BSD FRML MDRD: > 90 ML/MIN/1.73M2
GLUCOSE SERPL-MCNC: 197 MG/DL (ref 74–109)
HDLC SERPL-MCNC: 45 MG/DL
LDLC SERPL CALC-MCNC: 190 MG/DL
POTASSIUM SERPL-SCNC: 5.2 MEQ/L (ref 3.5–5.2)
PROT SERPL-MCNC: 7 G/DL (ref 6.4–8.3)
SODIUM SERPL-SCNC: 142 MEQ/L (ref 135–145)
TRIGL SERPL-MCNC: 107 MG/DL (ref 0–199)

## 2025-04-08 PROCEDURE — 36415 COLL VENOUS BLD VENIPUNCTURE: CPT

## 2025-04-08 PROCEDURE — 80061 LIPID PANEL: CPT

## 2025-04-08 PROCEDURE — 80053 COMPREHEN METABOLIC PANEL: CPT

## 2025-04-08 RX ORDER — ATORVASTATIN CALCIUM 40 MG/1
40 TABLET, FILM COATED ORAL DAILY
Qty: 90 TABLET | Refills: 1 | Status: SHIPPED | OUTPATIENT
Start: 2025-04-08 | End: 2025-10-05

## 2025-04-08 NOTE — RESULT ENCOUNTER NOTE
Cholesterol remains elevated at 256 with an LDL of 190.  Patient needs to increase the Lipitor, we will E prescribe 40 mg daily